# Patient Record
Sex: FEMALE | Race: BLACK OR AFRICAN AMERICAN | Employment: OTHER | URBAN - METROPOLITAN AREA
[De-identification: names, ages, dates, MRNs, and addresses within clinical notes are randomized per-mention and may not be internally consistent; named-entity substitution may affect disease eponyms.]

---

## 2019-11-20 RX ORDER — ESOMEPRAZOLE MAGNESIUM 40 MG/1
40 CAPSULE, DELAYED RELEASE ORAL DAILY
COMMUNITY

## 2019-11-20 NOTE — PERIOP NOTES
[facility]  Endoscopy Preprocedure Instructions      1. On the day of your surgery, please report to registration located on the 2nd floor of the  [facility]. yes    2. You must have a responsible adult to drive you to the hospital, stay at the hospital during your procedure and drive you home. If they leave your procedure will not be started (no exceptions). yes    3. Do not have anything to eat or drink (including water, gum, mints, coffee, and juice) after midnight. This does not apply to the medications you were instructed to take by your physician. yesIf you are currently taking Plavix, Coumadin, Aspirin, or other blood-thinning agents, contact your physician for special instructions. not applicable    4. If you are having a procedure that requires bowel prep: We recommend that you have only clear liquids the day before your procedure and begin your bowel prep by 5:00 pm.  You may continue to drink clear liquids until midnight. If for any reason you are not able to complete your prep please notify your physician immediately. yes    5. Have a list of all current medications, including vitamins, herbal supplements and any other over the counter medications. yes    6. If you wear glasses, contacts, dentures and/or hearing aids, they may be removed prior to procedure, please bring a case to store them in. yes    7. You should understand that if you do not follow these instructions your procedure may be cancelled. If your physical condition changes (I.e. fever, cold or flu) please contact your doctor as soon as possible. yes    8. It is important that you be on time.   If for any reason you are unable to keep your appointment please call [phone] the day of or your physicians office prior to your scheduled procedure

## 2019-11-26 ENCOUNTER — ANESTHESIA EVENT (OUTPATIENT)
Dept: ENDOSCOPY | Age: 69
End: 2019-11-26
Payer: MEDICARE

## 2019-11-26 ENCOUNTER — ANESTHESIA (OUTPATIENT)
Dept: ENDOSCOPY | Age: 69
End: 2019-11-26
Payer: MEDICARE

## 2019-11-26 ENCOUNTER — HOSPITAL ENCOUNTER (OUTPATIENT)
Age: 69
Setting detail: OUTPATIENT SURGERY
Discharge: HOME | End: 2019-11-26
Attending: SPECIALIST | Admitting: SPECIALIST
Payer: MEDICARE

## 2019-11-26 VITALS
TEMPERATURE: 97.6 F | WEIGHT: 136.24 LBS | HEART RATE: 58 BPM | DIASTOLIC BLOOD PRESSURE: 87 MMHG | HEIGHT: 62 IN | BODY MASS INDEX: 25.07 KG/M2 | OXYGEN SATURATION: 100 % | SYSTOLIC BLOOD PRESSURE: 139 MMHG | RESPIRATION RATE: 18 BRPM

## 2019-11-26 LAB — HGB BLD-MCNC: 9.2 G/DL (ref 11.5–16)

## 2019-11-26 PROCEDURE — 76060000031 HC ANESTHESIA FIRST 0.5 HR: Performed by: SPECIALIST

## 2019-11-26 PROCEDURE — 77030010936 HC CLP LIG BSC -C: Performed by: SPECIALIST

## 2019-11-26 PROCEDURE — 77030003657 HC NDL SCLER BSC -B: Performed by: SPECIALIST

## 2019-11-26 PROCEDURE — 74011250636 HC RX REV CODE- 250/636: Performed by: NURSE ANESTHETIST, CERTIFIED REGISTERED

## 2019-11-26 PROCEDURE — 74011000250 HC RX REV CODE- 250: Performed by: NURSE ANESTHETIST, CERTIFIED REGISTERED

## 2019-11-26 PROCEDURE — 85018 HEMOGLOBIN: CPT

## 2019-11-26 PROCEDURE — 74011250636 HC RX REV CODE- 250/636: Performed by: PHYSICIAN ASSISTANT

## 2019-11-26 PROCEDURE — 88305 TISSUE EXAM BY PATHOLOGIST: CPT

## 2019-11-26 PROCEDURE — 76040000019: Performed by: SPECIALIST

## 2019-11-26 PROCEDURE — 77030021593 HC FCPS BIOP ENDOSC BSC -A: Performed by: SPECIALIST

## 2019-11-26 RX ORDER — FLUMAZENIL 0.1 MG/ML
0.2 INJECTION INTRAVENOUS
Status: DISCONTINUED | OUTPATIENT
Start: 2019-11-26 | End: 2019-11-26 | Stop reason: HOSPADM

## 2019-11-26 RX ORDER — EPINEPHRINE 0.1 MG/ML
1 INJECTION INTRACARDIAC; INTRAVENOUS
Status: COMPLETED | OUTPATIENT
Start: 2019-11-26 | End: 2019-11-26

## 2019-11-26 RX ORDER — SODIUM CHLORIDE 9 MG/ML
50 INJECTION, SOLUTION INTRAVENOUS CONTINUOUS
Status: DISCONTINUED | OUTPATIENT
Start: 2019-11-26 | End: 2019-11-26 | Stop reason: HOSPADM

## 2019-11-26 RX ORDER — LIDOCAINE HYDROCHLORIDE 20 MG/ML
INJECTION, SOLUTION EPIDURAL; INFILTRATION; INTRACAUDAL; PERINEURAL AS NEEDED
Status: DISCONTINUED | OUTPATIENT
Start: 2019-11-26 | End: 2019-11-26 | Stop reason: HOSPADM

## 2019-11-26 RX ORDER — DEXTROMETHORPHAN/PSEUDOEPHED 2.5-7.5/.8
1.2 DROPS ORAL
Status: DISCONTINUED | OUTPATIENT
Start: 2019-11-26 | End: 2019-11-26 | Stop reason: HOSPADM

## 2019-11-26 RX ORDER — NALOXONE HYDROCHLORIDE 0.4 MG/ML
0.4 INJECTION, SOLUTION INTRAMUSCULAR; INTRAVENOUS; SUBCUTANEOUS
Status: DISCONTINUED | OUTPATIENT
Start: 2019-11-26 | End: 2019-11-26 | Stop reason: HOSPADM

## 2019-11-26 RX ORDER — MIDAZOLAM HYDROCHLORIDE 1 MG/ML
.25-5 INJECTION, SOLUTION INTRAMUSCULAR; INTRAVENOUS AS NEEDED
Status: DISCONTINUED | OUTPATIENT
Start: 2019-11-26 | End: 2019-11-26 | Stop reason: HOSPADM

## 2019-11-26 RX ORDER — FENTANYL CITRATE 50 UG/ML
25 INJECTION, SOLUTION INTRAMUSCULAR; INTRAVENOUS AS NEEDED
Status: DISCONTINUED | OUTPATIENT
Start: 2019-11-26 | End: 2019-11-26 | Stop reason: HOSPADM

## 2019-11-26 RX ORDER — PROPOFOL 10 MG/ML
INJECTION, EMULSION INTRAVENOUS AS NEEDED
Status: DISCONTINUED | OUTPATIENT
Start: 2019-11-26 | End: 2019-11-26 | Stop reason: HOSPADM

## 2019-11-26 RX ORDER — ATROPINE SULFATE 0.1 MG/ML
0.5 INJECTION INTRAVENOUS
Status: DISCONTINUED | OUTPATIENT
Start: 2019-11-26 | End: 2019-11-26 | Stop reason: HOSPADM

## 2019-11-26 RX ORDER — PROPOFOL 10 MG/ML
INJECTION, EMULSION INTRAVENOUS
Status: DISCONTINUED | OUTPATIENT
Start: 2019-11-26 | End: 2019-11-26 | Stop reason: HOSPADM

## 2019-11-26 RX ORDER — SODIUM CHLORIDE 9 MG/ML
INJECTION, SOLUTION INTRAVENOUS
Status: DISCONTINUED | OUTPATIENT
Start: 2019-11-26 | End: 2019-11-26 | Stop reason: HOSPADM

## 2019-11-26 RX ADMIN — LIDOCAINE HYDROCHLORIDE 20 MG: 20 INJECTION, SOLUTION INTRAVENOUS at 10:50

## 2019-11-26 RX ADMIN — EPINEPHRINE 0.1 MG: 0.1 INJECTION, SOLUTION ENDOTRACHEAL; INTRACARDIAC; INTRAVENOUS at 11:12

## 2019-11-26 RX ADMIN — SODIUM CHLORIDE: 9 INJECTION, SOLUTION INTRAVENOUS at 11:16

## 2019-11-26 RX ADMIN — PROPOFOL 60 MG: 10 INJECTION, EMULSION INTRAVENOUS at 10:46

## 2019-11-26 RX ADMIN — PROPOFOL 20 MG: 10 INJECTION, EMULSION INTRAVENOUS at 10:59

## 2019-11-26 RX ADMIN — SODIUM CHLORIDE: 9 INJECTION, SOLUTION INTRAVENOUS at 10:41

## 2019-11-26 RX ADMIN — PROPOFOL 100 MCG/KG/MIN: 10 INJECTION, EMULSION INTRAVENOUS at 10:46

## 2019-11-26 RX ADMIN — LIDOCAINE HYDROCHLORIDE 60 MG: 20 INJECTION, SOLUTION INTRAVENOUS at 10:46

## 2019-11-26 RX ADMIN — PROPOFOL 40 MG: 10 INJECTION, EMULSION INTRAVENOUS at 10:50

## 2019-11-26 RX ADMIN — PROPOFOL 30 MG: 10 INJECTION, EMULSION INTRAVENOUS at 10:57

## 2019-11-26 NOTE — ANESTHESIA POSTPROCEDURE EVALUATION
Procedure(s):  COLONOSCOPY  ESOPHAGOGASTRODUODENOSCOPY (EGD)  ESOPHAGOGASTRODUODENAL (EGD) BIOPSY  RESOLUTION CLIP  INJECTION. MAC    Anesthesia Post Evaluation      Multimodal analgesia: multimodal analgesia not used between 6 hours prior to anesthesia start to PACU discharge  Patient location during evaluation: PACU  Patient participation: complete - patient participated  Level of consciousness: awake and alert  Pain score: 0  Pain management: adequate  Airway patency: patent  Anesthetic complications: no  Cardiovascular status: hemodynamically stable and acceptable  Respiratory status: acceptable  Hydration status: acceptable  Comments: Patient seen and evaluated; no concerns. Post anesthesia nausea and vomiting:  none      Vitals Value Taken Time   /73 11/26/2019 11:48 AM   Temp 36.4 °C (97.6 °F) 11/26/2019 11:23 AM   Pulse 60 11/26/2019 11:52 AM   Resp 15 11/26/2019 11:52 AM   SpO2 100 % 11/26/2019 11:52 AM   Vitals shown include unvalidated device data.

## 2019-11-26 NOTE — PROCEDURES
[address] [facility] CONRAD Null MD  [phone]      2019    Esophagogastroduodenoscopy & Colonoscopy Procedure Note  Betty Escobar  : 1950  [address] [place] [address] Medical Record Number: [id number]      Indications:    Iron deficiency anemia, Melena/hematochezia Occult blood in stool  and Screening Colonoscopy   Referring Physician:  Lucius Garza MD  Anesthesia/Sedation: Conscious Sedation/Moderate Sedation/MAC  Endoscopist:  Dr. Maren Xavier  Complications:  None  Estimated Blood Loss:  None    Permit:  The indications, risks, benefits and alternatives were reviewed with the patient or their decision maker who was provided an opportunity to ask questions and all questions were answered. The specific risks of esophagogastroduodenoscopy with conscious sedation were reviewed, including but not limited to anesthetic complication, bleeding, adverse drug reaction, missed lesion, infection, IV site reactions, and intestinal perforation which would lead to the need for surgical repair. Alternatives to EGD and colonoscopy including radiographic imaging, observation without testing, or laboratory testing were reviewed as well as the limitations of those alternatives discussed. After considering the options and having all their questions answered, the patient or their decision maker provided both verbal and written consent to proceed. -----------EGD------------   Procedure in Detail:  After obtaining informed consent, positioning of the patient in the left lateral decubitus position, and conduction of a pre-procedure pause or \"time out\" the endoscope was introduced into the mouth and advanced to the duodenum. A careful inspection was made, and findings or interventions are described below. Findings:   Esophagus:normal  Stomach: Mild gastric erythema without ulceration or bleeding.   Cold forceps biopsies obtained for histology. Duodenum/jejunum: normal.  Cold forceps biopsies taken for histology. No blood in the upper GI tract today.        ----------Colonoscopy-----------    Procedure in Detail:  After obtaining informed consent, positioning of the patient in the left lateral decubitus position, and conduction of a pre-procedure pause or \"time out\" the endoscope was introduced into the anus and advanced to the cecum, which was identified by the ileocecal valve and appendiceal orifice. The quality of the colonic preparation was good. A careful inspection was made as the colonoscope was withdrawn, findings and interventions are described below. Findings:   Arduous colonoscopy today due to a large redundant sigmoid. Actively bleeding AVM of the cecum x1 - required clips x3 and 1cc 1:19991 epi for cessation of bleeding. Another AVM of cecum not bleeding - clipped for ablation. In the rectum, small internal hemorrhoids are noted without bleeding. No polyps ulcers colon masses or other AVMs noted. ------------------------------  Specimens:    none    Complications:   None; patient tolerated the procedure well. Impressions:  EGD:  Mild gastritis  Colonoscopy: AVM cecum bleeding - ablated. Recommendations:     - Await pathology.  -Continue iron and follow h/h to see if the treatment of the cecum AVMs resolve her anemia. Thank you for entrusting me with this patient's care. Please do not hesitate to contact me with any questions or if I can be of assistance with any of your other patients' GI needs. Signed By: Jessica Calixto MD                        November 26, 2019    Surgical assistant none. Implants none unless specified.

## 2019-11-26 NOTE — PERIOP NOTES
1046: Anesthesia staff at patient's bedside administering anesthesia and monitoring patients vital signs throughout procedure. See anesthesia note. 1115: Patient tolerated procedure without problems. Abdomen soft and patient arousable and voices no complaints Report received from CRNA, see anesthesia note. Patient transported to endoscopy recovery area.  Endoscope was pre-cleaned at bedside immediately following procedure by Aiden Desai.     1120: Report given to recovery nurse Jasmyne Barrera RN

## 2019-11-26 NOTE — DISCHARGE INSTRUCTIONS
[address] [facility] CONRAD Garcia MD  [phone]      November 26, 2019    Leslie Lozada  YOB: 1950    COLONOSCOPY DISCHARGE INSTRUCTIONS    If there is redness at IV site you should apply warm compress to area. If redness or soreness persist contact Dr. Mei Garcia' or your primary care doctor. There may be a slight amount of blood passed from the rectum. Gaseous discomfort may develop, but walking, belching will help relieve this. You may not operate a vehicle for 12 hours  You may not operate machinery or dangerous appliances for rest of today  You may not drink alcoholic beverages for 12 hours  Avoid making any critical decisions for 24 hours    DIET:  You may resume your normal diet, but some patients find that heavy or large meals may lead to indigestion or vomiting. I suggest a light meal as first food intake. MEDICATIONS:  The use of some over-the-counter pain medication may lead to bleeding after colon biopsies or polyp removal.  Tylenol (also called acetaminophen) is safe to take even if you have had colonoscopy with polyp removal.  Based on the procedure you had today you may not safely take aspirin or aspirin-like products for the next ten (10) days. Remember that Tylenol (also called acetaminophen) is safe to take after colonoscopy even if you have had biopsies or polyps removed. ACTIVITY:  You may resume your normal household activities, but it is recommended that you spend the remainder of the day resting -  avoid any strenuous activity. CALL DR. Marin Ceja' OFFICE IF:  Increasing pain, nausea, vomiting  Abdominal distension (swelling)  Significant new or increased bleeding (oral or rectal)  Fever/Chills  Chest pain/shortness of breath                       Additional instructions:   No aspirin 10 days. We found two blood vessels in the colon which were bleeding and we stapled them closed today. I think this is the cause of your anemia. I want you to keep taking iron and get your blood tested again in 8 weeks. It was an honor to be your doctor today. Please let me or my office staff know if you have any feedback about today's procedure. Sneha Melgoza MD    Colonoscopy saves lives, and can prevent colon cancer. Everyone aged 48 or older needs colonoscopy.   Tell your family and friends: get the test!

## 2019-11-26 NOTE — INTERVAL H&P NOTE
Pre-Endoscopy H&P Update Chief complaint/HPI/ROS:  The indication for the procedure, the patient's history and the patient's current medications are reviewed prior to the procedure and that data is reported on the H&P to which this document is attached. Any significant complaints with regard to organ systems will be noted, and if not mentioned then a review of systems is not contributory. Past Medical History:  
Diagnosis Date  Arthritis  Degenerative disc disease  Gastrointestinal disorder   
 acid reflux  GERD (gastroesophageal reflux disease)  Pneumonia 2011 Past Surgical History:  
Procedure Laterality Date  HX CHOLECYSTECTOMY    HX HYSTERECTOMY  HX OTHER SURGICAL Previous GI bleed Social  
Social History Tobacco Use  Smoking status: Former Smoker Last attempt to quit: 1987 Years since quittin.4  Smokeless tobacco: Never Used Substance Use Topics  Alcohol use: Yes Alcohol/week: 0.8 standard drinks Types: 1 Glasses of wine per week Comment: rare Family History Problem Relation Age of Onset  Cancer Father  Diabetes Mother  Cancer Brother Allergies Allergen Reactions  Levaquin [Levofloxacin] Hives Prior to Admission Medications Prescriptions Last Dose Informant Patient Reported? Taking? BISACODYL (DULCOLAX PO) 2019 at Unknown time  Yes Yes Sig: Take 1 Tab by mouth two (2) times a day. CALCIUM CARBONATE/VITAMIN D3 (CALCIUM 600 + D,3, PO) 2019  Yes No  
Sig: Take 1 Tab by mouth two (2) times a day. MULTIVITAMIN PO 2019  Yes No  
Sig: Take 1 Tab by mouth.  
esomeprazole (NEXIUM) 40 mg capsule 2019 at Unknown time  Yes Yes Sig: Take 40 mg by mouth daily. guaifenesin SR (MUCINEX) 600 mg SR tablet 2019 at Unknown time  No Yes Sig: Take 1 Tab by mouth two (2) times daily as needed for Congestion. lactulose (CHRONULAC) 10 gram/15 mL solution Not Taking at Unknown time  No No  
Sig: Take 15 mL by mouth every eight (8) hours as needed for Other (constipation). loratadine (CLARITIN) 10 mg tablet 4/26/2019 at Unknown time  Yes No  
Sig: Take 10 mg by mouth daily. omeprazole (PRILOSEC) 20 mg capsule Not Taking at Unknown time  Yes No  
Sig: Take 20 mg by mouth two (2) times a day. polyethylene glycol 3350 (MIRALAX PO) Not Taking at Unknown time  Yes No  
Sig: Take  by mouth.  
potassium chloride (K-DUR, KLOR-CON) 10 mEq tablet Not Taking at Unknown time  No No  
Sig: Take 1 Tab by mouth two (2) times a day. Facility-Administered Medications: None PHYSICAL EXAM:  The patient is examined immediately prior to the procedure. Visit Vitals BP 98/50 Pulse 68 Resp 16 Ht 5' 2\" (1.575 m) Wt 61.8 kg (136 lb 3.9 oz) SpO2 100% BMI 24.92 kg/m² Gen: Appears comfortable, no distress. Pulm: comfortable respirations with no abnormal audible breath sounds HEART: well perfused, no abnormal audible heart sounds GI: abdomen flat. PLAN:  Informed consent discussion held, patient afforded an opportunity to ask questions and all questions answered. After being advised of the risks, benefits, and alternatives, the patient requested that we proceed and indicated so on a written consent form. Will proceed with procedure as planned.  
Jesus Alberto Oh MD

## 2019-11-26 NOTE — ROUTINE PROCESS
Adalid Dashawn 1950 
[id number] Situation: 
Verbal report received from: LG Desai RN Procedure: Procedure(s) with comments: 
COLONOSCOPY 
ESOPHAGOGASTRODUODENOSCOPY (EGD) ESOPHAGOGASTRODUODENAL (EGD) BIOPSY RESOLUTION CLIP - x3 Background: 
 
Preoperative diagnosis: SCREENING COLONOSCOPY(SCREENING FOR COLONIC NEOPLASIA) HEMATOCHEZIA 
GERD ANEMIA,UNSPECIFIED Postoperative diagnosis: AVM cecum, hemorrhoids :  Dr. Jones Mena Assistant(s): Endoscopy Technician-1: Rosa M Sidhu Endoscopy RN-1: Shamir Rhodes RN Specimens:  
ID Type Source Tests Collected by Time Destination 1 : Biopsy  Preservative Duodenum  Crow Cloes MD 11/26/2019 1050 Pathology 2 : Biopsy Antrum Preservative   Crow Coles MD 11/26/2019 1050 Pathology H. Pylori  no Assessment: 
Intra-procedure medications Anesthesia gave intra-procedure sedation and medications, see anesthesia flow sheet yes Intravenous fluids: NS@ Arline Banco Vital signs stable Abdominal assessment: round and soft Recommendation: 
Discharge patient per MD order. Family or Friend Permission to share finding with family or friend yes Endoscopy discharge instructions have been reviewed and given to patient and cousin. The patient and cousin verbalized understanding and acceptance of instructions.

## 2019-11-26 NOTE — ANESTHESIA PREPROCEDURE EVALUATION
Relevant Problems   No relevant active problems       Anesthetic History   No history of anesthetic complications            Review of Systems / Medical History  Patient summary reviewed and nursing notes reviewed    Pulmonary  Within defined limits                 Neuro/Psych   Within defined limits           Cardiovascular                  Exercise tolerance: >4 METS     GI/Hepatic/Renal     GERD           Endo/Other        Arthritis and anemia     Other Findings   Comments: Patient admits to having GI bleed           Physical Exam    Airway  Mallampati: II    Neck ROM: normal range of motion   Mouth opening: Normal     Cardiovascular    Rhythm: regular  Rate: normal         Dental         Pulmonary  Breath sounds clear to auscultation               Abdominal         Other Findings            Anesthetic Plan    ASA: 2  Anesthesia type: MAC            Anesthetic plan and risks discussed with: Patient      Informed consent obtained.

## 2019-11-26 NOTE — H&P
Date: 2019 10:45 AM   Patient Name: Diana Ruiz   Account #: [id number]    Gender: Female    (age): 1950 (77)       Provider:     Daphine Apgar. Janet Chinchilla MD        Referring Physician:     Aly Solano MD  [address] [facility], [address] [place] [address] Ave  [phone] (phone)  [phone] (fax)        Chief Complaint: I want to get checked           History of Present Illness:   77year old female reports intermittent anal bleeding with hard stool, she will see streaks of blood on toilet tissue. She discussed this with Dr. Chet Harada at her recent annual physical and was found to have occult positive stool for blood as well as mild anemia. She's started iron therapy and notes intermittent black stool since then. Has GERD with intermittent pyrosis and heartburn for which she takes PPI with good control. Denies abdominal pain, change in bowel habits other than what is mentioned here, denies thin stool or rectal anal pain. We negotiated that she would have bidirectional endoscopy and possibly capsule endoscopy for these symptoms? 77year old female reports intermittent anal bleeding with hard stool, she will see streaks of blood on toilet tissue. She discussed this with Dr. Chet Harada at her recent annual physical and was found to have occult positive stool for blood as well as mild anemia. She's started iron therapy and notes intermittent black stool since then. Has GERD with intermittent pyrosis and heartburn for which she takes PPI with good control. Denies abdominal pain, change in bowel habits other than what is mentioned here, denies thin stool or rectal anal pain. We negotiated that she would have bidirectional endoscopy and possibly capsule endoscopy for these symptoms?        Past Medical History      Medical Conditions: acid reflux  Anemia  Arthritis  constipation   Surgical Procedures: Gallbladder surgery, 2011  Hysterectomy, 1992   Dx Studies: Abdominal U/S,   Barium Swallow, 2011  CAT Scan, 2011  Colonoscopy, 2010/2011  Egd With Bravo Placement, 8/5/2013, No Esophagitis Was Present, No Hiatal Hernia Was Noted and No Cause For Symptoms of excess salivation or reflux Was Found. Pre-Procedure Call, 7/2/2013   Medications: Claritin 10 mg Take 1 tablet by mouth once a day  ferrous sulfate 325 mg (65 mg iron) Take 1 tablet by mouth once a day  Nexium 40 mg Take 1 capsule by mouth once a day before breakfast for 90 days  Stool Softener 100 mg Take 2 capsule by mouth once a day   Allergies: hydroxyzine HCl  Levaquin - rash   Immunizations: Influenza, seasonal, injectable, 9/21/2019  Pneumococcal conjugate PCV 13, 2017      Social History      Alcohol: None   Tobacco: Former smokerCigarettes 10 cigarettes a day, quit 1987. Drugs: None   Exercise: Exercise 3 or more times a week. Walking/ Running. Caffeine: None   Marital Status:          Occupation:               Family History No history of Celiac sprue, Colon Cancer, Colon Polyps, Esophageal Cancer, Esophogeal Cancer, IBD (Crohn's or UC), Inflammatory bowel disease (Crohn's or Ulcerative Colitis), Liver disease, Stomach Cancer  Other: Diagnosed with Stomach cancer; Father: Diagnosed with GI cancers; Review of Systems:   Cardiovascular: Denies chest pain, irregular heart beat, palpitations, peripheral edema, syncope, Sweats. Constitutional: Denies fatigue, fever, loss of appetite, weight gain, weight loss. ENMT: Denies nose bleeds, sore throat, hearing loss. Endocrine: Denies excessive thirst, heat intolerance. Eyes: Denies loss of vision. Gastrointestinal: Denies abdominal pain, abdominal swelling, change in bowel habits, constipation, diarrhea, Bloating/gas, heartburn, jaundice, nausea, rectal bleeding, stomach cramps, vomiting, dysphagia, rectal pain, Stool incontinence, hematemesis. Genitourinary: Denies dark urine, dysuria, frequent urination, hematuria, incontinence.    Hematologic/Lymphatic: Denies easy bruising, prolonged bleeding. Integumentary: Denies itching, rashes, sun sensitivity. Musculoskeletal: Presents suffers from arthritis, back pain. Denies gout, joint pain, muscle weakness, stiffness. Neurological: Denies dizziness, fainting, frequent headaches, memory loss. Psychiatric: Denies anxiety, depression, difficulty sleeping, hallucinations, nervousness, panic attacks, paranoia. Respiratory: Denies cough, dyspnea, wheezing. Vital Signs:   BP  (mmHg)  Pulse  (ppm) Weight (lbs/oz) Height (ft/in) BMI Temp   164/81 64 140 / 2 5 / 2 25.63 97.5 (F)      Physical Exam:   Constitutional:      Appearance: No distress, appears comfortable. Communication: Understands/receives spoken information. Skin:      Inspection: No rash, no jaundice. Palpation: No subcutaneous nodules. Head/face: Inspection: Normacephalic, atraumatic. Palpation: normal.   Eyes:      Conjunctivae/lids: Normal.   Pupils/irises: Pupils equal, round and normal.   ENMT:      External: Normal.   Hearing: Normal.   Neck:      Neck: Normal appearance, trachea midline. Jugular veins: No JVD noted. Respiratory:      Effort: Normal respiratory effort, comfortable, speaks in complete sentences. Auscultation: normal breath sounds, no rubs, wheezes or rhonchi. Gastrointestinal/Abdomen:      Abdomen: non-distended, nontender. Liver/Spleen: normal, normal size, Liver size and consistency normal, spleen is non-palpable. Musculoskeletal:      Gait/station: normal.   Digits/nails: Normal, no spooning of nails, clubbing, or splinter hemorrhages, no clubbing, cyanosis, petechiae or other inflammatory conditions. Psychiatric:      Judgment/insight: Normal, normal judgement, normal insight. Orientation: oriented to time, space and person. Lymphatic:      Neck: No lymphadenopathy in the cervical or supraclavicular chain. Other: No periumbilic lymphadenopathy.         Lab Results: No Electronic Results      Impressions: Screening colonoscopy (Screening for colonic neoplasia)  GERD  Hematochezia  Anemia, unspecified? ?Screening colonoscopy (Screening for colonic neoplasia)? ?  ? ?GERD? ?  ? ? Hematochezia? ?  ? ? Anemia, unspecified? Assessment: ?         Plan: Upper Endoscopy  Colonoscopy? ? Upper Endoscopy? ?  ? ? Colonoscopy? Risk & Medical Necessity: The patient requires Moderate to High Severity care for this visit. Diagnosis and management options are Minimal. The amount of data reviewed and/or ordered is Minimal/None. The level of risk is Minimal.           Notes:              Sarah Chowdhury MD     Electronically signed on 2019 10:58:45 AM by Sarah Chowdhury, [address] [facility], MRN [id number],  1950 First Visit, 2019                                                                                                                                                        New     Modify          Delete     Delete all     Edit Wording          Sign     [email or web address]

## 2019-12-22 ENCOUNTER — APPOINTMENT (OUTPATIENT)
Dept: GENERAL RADIOLOGY | Age: 69
End: 2019-12-22
Attending: EMERGENCY MEDICINE
Payer: MEDICARE

## 2019-12-22 ENCOUNTER — HOSPITAL ENCOUNTER (OUTPATIENT)
Age: 69
Setting detail: OBSERVATION
Discharge: HOME | End: 2019-12-22
Attending: EMERGENCY MEDICINE | Admitting: INTERNAL MEDICINE
Payer: MEDICARE

## 2019-12-22 VITALS
DIASTOLIC BLOOD PRESSURE: 69 MMHG | SYSTOLIC BLOOD PRESSURE: 128 MMHG | HEART RATE: 68 BPM | OXYGEN SATURATION: 98 % | TEMPERATURE: 97.8 F | RESPIRATION RATE: 13 BRPM

## 2019-12-22 DIAGNOSIS — M25.512 PAIN IN JOINT OF LEFT SHOULDER: ICD-10-CM

## 2019-12-22 DIAGNOSIS — R07.89 CHEST WALL PAIN: Primary | ICD-10-CM

## 2019-12-22 LAB
ALBUMIN SERPL-MCNC: 3.4 G/DL (ref 3.5–5)
ALBUMIN/GLOB SERPL: 0.8 {RATIO} (ref 1.1–2.2)
ALP SERPL-CCNC: 64 U/L (ref 45–117)
ALT SERPL-CCNC: 19 U/L (ref 12–78)
ANION GAP SERPL CALC-SCNC: 6 MMOL/L (ref 5–15)
AST SERPL-CCNC: 12 U/L (ref 15–37)
BASOPHILS # BLD: 0.1 K/UL (ref 0–0.1)
BASOPHILS NFR BLD: 1 % (ref 0–1)
BILIRUB SERPL-MCNC: 0.2 MG/DL (ref 0.2–1)
BUN SERPL-MCNC: 8 MG/DL (ref 6–20)
BUN/CREAT SERPL: 10 (ref 12–20)
CALCIUM SERPL-MCNC: 8.9 MG/DL (ref 8.5–10.1)
CHLORIDE SERPL-SCNC: 107 MMOL/L (ref 97–108)
CO2 SERPL-SCNC: 27 MMOL/L (ref 21–32)
COMMENT, HOLDF: NORMAL
CREAT SERPL-MCNC: 0.79 MG/DL (ref 0.55–1.02)
DIFFERENTIAL METHOD BLD: ABNORMAL
EOSINOPHIL # BLD: 0.2 K/UL (ref 0–0.4)
EOSINOPHIL NFR BLD: 4 % (ref 0–7)
ERYTHROCYTE [DISTWIDTH] IN BLOOD BY AUTOMATED COUNT: 13.1 % (ref 11.5–14.5)
GLOBULIN SER CALC-MCNC: 4.2 G/DL (ref 2–4)
GLUCOSE SERPL-MCNC: 104 MG/DL (ref 65–100)
HCT VFR BLD AUTO: 32.8 % (ref 35–47)
HGB BLD-MCNC: 10.2 G/DL (ref 11.5–16)
IMM GRANULOCYTES # BLD AUTO: 0 K/UL (ref 0–0.04)
IMM GRANULOCYTES NFR BLD AUTO: 1 % (ref 0–0.5)
LYMPHOCYTES # BLD: 1.3 K/UL (ref 0.8–3.5)
LYMPHOCYTES NFR BLD: 26 % (ref 12–49)
MCH RBC QN AUTO: 27.1 PG (ref 26–34)
MCHC RBC AUTO-ENTMCNC: 31.1 G/DL (ref 30–36.5)
MCV RBC AUTO: 87.2 FL (ref 80–99)
MONOCYTES # BLD: 0.4 K/UL (ref 0–1)
MONOCYTES NFR BLD: 8 % (ref 5–13)
NEUTS SEG # BLD: 3.1 K/UL (ref 1.8–8)
NEUTS SEG NFR BLD: 60 % (ref 32–75)
NRBC # BLD: 0 K/UL (ref 0–0.01)
NRBC BLD-RTO: 0 PER 100 WBC
PLATELET # BLD AUTO: 412 K/UL (ref 150–400)
PMV BLD AUTO: 10.2 FL (ref 8.9–12.9)
POTASSIUM SERPL-SCNC: 3.7 MMOL/L (ref 3.5–5.1)
PROT SERPL-MCNC: 7.6 G/DL (ref 6.4–8.2)
RBC # BLD AUTO: 3.76 M/UL (ref 3.8–5.2)
SAMPLES BEING HELD,HOLD: NORMAL
SODIUM SERPL-SCNC: 140 MMOL/L (ref 136–145)
TROPONIN I SERPL-MCNC: <0.05 NG/ML
TROPONIN I SERPL-MCNC: <0.05 NG/ML
WBC # BLD AUTO: 5 K/UL (ref 3.6–11)

## 2019-12-22 PROCEDURE — 80053 COMPREHEN METABOLIC PANEL: CPT

## 2019-12-22 PROCEDURE — 74011250637 HC RX REV CODE- 250/637: Performed by: EMERGENCY MEDICINE

## 2019-12-22 PROCEDURE — 96372 THER/PROPH/DIAG INJ SC/IM: CPT

## 2019-12-22 PROCEDURE — 36415 COLL VENOUS BLD VENIPUNCTURE: CPT

## 2019-12-22 PROCEDURE — 74011250636 HC RX REV CODE- 250/636: Performed by: INTERNAL MEDICINE

## 2019-12-22 PROCEDURE — 99218 HC RM OBSERVATION: CPT

## 2019-12-22 PROCEDURE — 93005 ELECTROCARDIOGRAM TRACING: CPT

## 2019-12-22 PROCEDURE — 74011250637 HC RX REV CODE- 250/637: Performed by: INTERNAL MEDICINE

## 2019-12-22 PROCEDURE — 99285 EMERGENCY DEPT VISIT HI MDM: CPT

## 2019-12-22 PROCEDURE — 84484 ASSAY OF TROPONIN QUANT: CPT

## 2019-12-22 PROCEDURE — 71046 X-RAY EXAM CHEST 2 VIEWS: CPT

## 2019-12-22 PROCEDURE — 85025 COMPLETE CBC W/AUTO DIFF WBC: CPT

## 2019-12-22 RX ORDER — SODIUM CHLORIDE 0.9 % (FLUSH) 0.9 %
5-40 SYRINGE (ML) INJECTION AS NEEDED
Status: DISCONTINUED | OUTPATIENT
Start: 2019-12-22 | End: 2019-12-22 | Stop reason: HOSPADM

## 2019-12-22 RX ORDER — SODIUM CHLORIDE 0.9 % (FLUSH) 0.9 %
5-40 SYRINGE (ML) INJECTION EVERY 8 HOURS
Status: DISCONTINUED | OUTPATIENT
Start: 2019-12-22 | End: 2019-12-22 | Stop reason: HOSPADM

## 2019-12-22 RX ORDER — ONDANSETRON 2 MG/ML
4 INJECTION INTRAMUSCULAR; INTRAVENOUS
Status: DISCONTINUED | OUTPATIENT
Start: 2019-12-22 | End: 2019-12-22 | Stop reason: HOSPADM

## 2019-12-22 RX ORDER — ACETAMINOPHEN 325 MG/1
650 TABLET ORAL
Status: DISCONTINUED | OUTPATIENT
Start: 2019-12-22 | End: 2019-12-22 | Stop reason: HOSPADM

## 2019-12-22 RX ORDER — PANTOPRAZOLE SODIUM 40 MG/1
40 TABLET, DELAYED RELEASE ORAL
Status: DISCONTINUED | OUTPATIENT
Start: 2019-12-22 | End: 2019-12-22 | Stop reason: HOSPADM

## 2019-12-22 RX ORDER — NALOXONE HYDROCHLORIDE 0.4 MG/ML
0.4 INJECTION, SOLUTION INTRAMUSCULAR; INTRAVENOUS; SUBCUTANEOUS AS NEEDED
Status: DISCONTINUED | OUTPATIENT
Start: 2019-12-22 | End: 2019-12-22 | Stop reason: HOSPADM

## 2019-12-22 RX ORDER — GUAIFENESIN 100 MG/5ML
81 LIQUID (ML) ORAL DAILY
Status: DISCONTINUED | OUTPATIENT
Start: 2019-12-23 | End: 2019-12-22 | Stop reason: HOSPADM

## 2019-12-22 RX ORDER — ASPIRIN 325 MG
325 TABLET ORAL
Status: COMPLETED | OUTPATIENT
Start: 2019-12-22 | End: 2019-12-22

## 2019-12-22 RX ORDER — MORPHINE SULFATE 2 MG/ML
2 INJECTION, SOLUTION INTRAMUSCULAR; INTRAVENOUS
Status: DISCONTINUED | OUTPATIENT
Start: 2019-12-22 | End: 2019-12-22 | Stop reason: HOSPADM

## 2019-12-22 RX ORDER — ENOXAPARIN SODIUM 100 MG/ML
40 INJECTION SUBCUTANEOUS EVERY 24 HOURS
Status: DISCONTINUED | OUTPATIENT
Start: 2019-12-22 | End: 2019-12-22 | Stop reason: HOSPADM

## 2019-12-22 RX ADMIN — ENOXAPARIN SODIUM 40 MG: 40 INJECTION SUBCUTANEOUS at 08:23

## 2019-12-22 RX ADMIN — Medication 10 ML: at 07:11

## 2019-12-22 RX ADMIN — ASPIRIN 325 MG: 325 TABLET, FILM COATED ORAL at 06:29

## 2019-12-22 RX ADMIN — PANTOPRAZOLE SODIUM 40 MG: 40 TABLET, DELAYED RELEASE ORAL at 08:24

## 2019-12-22 NOTE — ED NOTES
Bedside shift change report given to Theo Ellison  (oncoming nurse) by iCarra Mercer RN (offgoing nurse).  Report included the following information SBAR, Kardex, Intake/Output, MAR, Recent Results

## 2019-12-22 NOTE — DISCHARGE INSTRUCTIONS
ACUTE DIAGNOSES:  Atypical chest pain [R07.89]    CHRONIC MEDICAL DIAGNOSES:  Problem List as of 12/22/2019 Date Reviewed: 9/16/2011          Codes Class Noted - Resolved    * (Principal) Atypical chest pain ICD-10-CM: R07.89  ICD-9-CM: 786.59  12/22/2019 - Present        Depression with anxiety ICD-10-CM: F41.8  ICD-9-CM: 300.4  9/11/2011 - Present        Iron deficiency anemia, unspecified ICD-10-CM: D50.9  ICD-9-CM: 280.9  6/17/2011 - Present        GERD (gastroesophageal reflux disease) (Chronic) ICD-10-CM: K21.9  ICD-9-CM: 530.81  6/16/2011 - Present        RESOLVED: Hypopotassemia ICD-10-CM: E87.6  ICD-9-CM: 276.8  9/11/2011 - 9/16/2011        RESOLVED: Unspecified constipation ICD-10-CM: K59.00  ICD-9-CM: 564.00  9/11/2011 - 9/16/2011        RESOLVED: GI bleed ICD-10-CM: K92.2  ICD-9-CM: 578.9  9/11/2011 - 9/16/2011        RESOLVED: Abdominal pain, generalized ICD-10-CM: R10.84  ICD-9-CM: 789.07  6/16/2011 - 9/16/2011        RESOLVED: Nausea & vomiting ICD-10-CM: R11.2  ICD-9-CM: 787.01  6/16/2011 - 9/16/2011        RESOLVED: Hyposmolality and/or hyponatremia ICD-10-CM: E87.1  ICD-9-CM: 276.1  6/16/2011 - 9/16/2011              DISCHARGE MEDICATIONS:          · It is important that you take the medication exactly as they are prescribed. · Keep your medication in the bottles provided by the pharmacist and keep a list of the medication names, dosages, and times to be taken in your wallet. · Do not take other medications without consulting your doctor. DIET:  Regular Diet    ACTIVITY: Activity as tolerated    ADDITIONAL INFORMATION: If you experience any of the following symptoms then please call your primary care physician or return to the emergency room if you cannot get hold of your doctor: Fever, chills, nausea, vomiting, diarrhea, change in mentation, falling, bleeding, shortness of breath.     FOLLOW UP CARE:  Dr. Lucius Garza MD  you are to call and set up an appointment to see them in 2 weeks.    Follow-up with cardiology ( they will make appointment for you). Information obtained by :  I understand that if any problems occur once I am at home I am to contact my physician. I understand and acknowledge receipt of the instructions indicated above.                                                                                                                                            Physician's or R.N.'s Signature                                                                  Date/Time                                                                                                                                              Patient or Representative Signature                                                          Date/Time

## 2019-12-22 NOTE — ED TRIAGE NOTES
Patient arrives from home via EMS for complaints of left shoulder pain that radiates down her left arm

## 2019-12-22 NOTE — CONSULTS
Becky Long DO  [facility] [id number] [facility], [address] [place] [address], [address] [place] [address] [address]                                       Office [phone],TYESHA [phone]  Office [phone],XWQ [phone]      Date of  Admission: 12/22/2019  3:25 AM  PCP- Renee Sotelo MD    Danyelle Morejon is a 68 y.o. female admitted for Atypical chest pain [R07.89]. Consult requested by Sharron Umaña MD    Assessment/Plan      Atypical chest pain syndrome    Initial troponin is negative. Recommend second troponin, if negative recommend d/c with outpatient stress testing. Will arrange for outpatient stress testing and f/up         I appreciate the opportunity to be involved in Ms. Mahmood. See below note for details. Please do not hesitate to contact us with questions or concerns. Arpita Gardner DO      Subjective:  Danyelle Morejon is a 68 y.o. female with hx of GERD presents with shoulder pain. She woke up last evening with left shoulder pain that radiated across her neck and to her right shoulder. At that same time she reports some discomfort in her chest.  She was concerned and came to ED to assure that she was not having a heart attack. No associated sob, diaphoresis, nausea. She has never developed any exertional angina symptoms. Reports having negative stress test many years ago. No significant CAD risk factors outside of age.     Past Medical History:   Diagnosis Date    Arthritis     Degenerative disc disease     Gastrointestinal disorder     acid reflux    GERD (gastroesophageal reflux disease)     Pneumonia 05/2011      Past Surgical History:   Procedure Laterality Date    COLONOSCOPY N/A 11/26/2019    COLONOSCOPY performed by Ousmane Vicente MD at [address] [facility] HX CHOLECYSTECTOMY  2011    HX HYSTERECTOMY      HX OTHER SURGICAL      Previous GI bleed     Allergies   Allergen Reactions    Levaquin [Levofloxacin] Hives     Family History   Problem Relation Age of Onset    Cancer Father     Diabetes Mother     Cancer Brother       Social History     Tobacco Use    Smoking status: Former Smoker     Last attempt to quit: 1987     Years since quittin.5    Smokeless tobacco: Never Used   Substance Use Topics    Alcohol use: Yes     Alcohol/week: 0.8 standard drinks     Types: 1 Glasses of wine per week     Comment: rare    Drug use: No          Medications:  (Not in a hospital admission)    Current Facility-Administered Medications   Medication Dose Route Frequency    sodium chloride (NS) flush 5-40 mL  5-40 mL IntraVENous Q8H    sodium chloride (NS) flush 5-40 mL  5-40 mL IntraVENous PRN    acetaminophen (TYLENOL) tablet 650 mg  650 mg Oral Q4H PRN    naloxone (NARCAN) injection 0.4 mg  0.4 mg IntraVENous PRN    ondansetron (ZOFRAN) injection 4 mg  4 mg IntraVENous Q4H PRN    enoxaparin (LOVENOX) injection 40 mg  40 mg SubCUTAneous Q24H    morphine injection 2 mg  2 mg IntraVENous Q4H PRN    pantoprazole (PROTONIX) tablet 40 mg  40 mg Oral ACB    [START ON 2019] aspirin chewable tablet 81 mg  81 mg Oral DAILY     Current Outpatient Medications   Medication Sig    esomeprazole (NEXIUM) 40 mg capsule Take 40 mg by mouth daily.  polyethylene glycol 3350 (MIRALAX PO) Take  by mouth.  potassium chloride (K-DUR, KLOR-CON) 10 mEq tablet Take 1 Tab by mouth two (2) times a day.  omeprazole (PRILOSEC) 20 mg capsule Take 20 mg by mouth two (2) times a day.  guaifenesin SR (MUCINEX) 600 mg SR tablet Take 1 Tab by mouth two (2) times daily as needed for Congestion.  BISACODYL (DULCOLAX PO) Take 1 Tab by mouth two (2) times a day.  loratadine (CLARITIN) 10 mg tablet Take 10 mg by mouth daily.  lactulose (CHRONULAC) 10 gram/15 mL solution Take 15 mL by mouth every eight (8) hours as needed for Other (constipation).  MULTIVITAMIN PO Take 1 Tab by mouth.     CALCIUM CARBONATE/VITAMIN D3 (CALCIUM 600 + D,3, PO) Take 1 Tab by mouth two (2) times a day. Review of Systems:  Pertinent Positive: left shoulder pain  Pertinent Negative: No dyspnea on exertion, shortness of breath, orthopnea, PND    All Other systems reviewed and are negative for a Comprehensive ROS (10+)        Physical Exam:  Visit Vitals  /75 (BP 1 Location: Left arm, BP Patient Position: At rest)   Pulse (!) 56   Temp 97.8 °F (36.6 °C)   Resp 17   SpO2 96%           Gen: Well-developed, well-nourished, in no acute distress  HEENT:  Pink conjunctivae, hearing intact to voice, moist mucous membranes  Neck: Supple,No JVD, No Carotid Bruit  Resp: No accessory muscle use, Clear breath sounds, No rales or rhonchi  Card: Normal Rate,Regular Rythm,Normal S1, S2, No murmurs, rubs or gallop. No thrills.    Abd:  Soft, non-tender, non-distended, normoactive bowel sounds are present   MSK: No cyanosis or clubbing  Skin: No rashes or ulcers  Neuro:  Cranial nerves are grossly intact, moving all four extremities, no focal deficit, follows commands appropriately  Psych:  Good insight, oriented to person, place and time, alert, Nml Affect  LE: No edema  Vascular:Distal Pulses 2+ and symmetric          EKG: sinus rhythmia, pvc, nsst changes    LABS:    Lab Results   Component Value Date/Time    WBC 5.0 12/22/2019 03:54 AM    HGB 10.2 (L) 12/22/2019 03:54 AM    HCT 32.8 (L) 12/22/2019 03:54 AM    PLATELET 911 (H) 73/06/2279 03:54 AM    MCV 87.2 12/22/2019 03:54 AM     Lab Results   Component Value Date/Time    Sodium 140 12/22/2019 03:54 AM    Potassium 3.7 12/22/2019 03:54 AM    Chloride 107 12/22/2019 03:54 AM    CO2 27 12/22/2019 03:54 AM    Anion gap 6 12/22/2019 03:54 AM    Glucose 104 (H) 12/22/2019 03:54 AM    BUN 8 12/22/2019 03:54 AM    Creatinine 0.79 12/22/2019 03:54 AM    BUN/Creatinine ratio 10 (L) 12/22/2019 03:54 AM    GFR est AA >60 12/22/2019 03:54 AM    GFR est non-AA >60 12/22/2019 03:54 AM    Calcium 8.9 12/22/2019 03:54 AM     Lab Results   Component Value Date/Time     (H) 10/28/2011 02:26 PM    CK-MB Index 1.1 10/28/2011 02:26 PM    Troponin-I, Qt. <0.05 12/22/2019 03:54 AM     No results found for: APTT  No results found for: INR, PTMR, PTP, PT1, PT2, INREXT        Sahara Lopez DO

## 2019-12-22 NOTE — H&P
[address]  [phone]    Admission History and Physical      NAME:              Hammad Myers   :   1950   MRN:  [id number]     PCP:  Shola Marroquin MD     Date:     2019     Chief  Complaint: Chest pain    History Of Presenting Illness:       Ms. Celine Menard is a 68 y.o. female who is being Observed for atypical chest pain. Ms. Celine Menard presented to our Emergency Department today complaining of having been woken up with severe left shoulder and chest pain. This radiated to her neck and back. No dizziness but she felt light headed. No strenuous exertion in recent days. She also denied any cough or fever. In the ED, a chest X ray done was unremarkable. EKG showed a junctional rhythm. Troponin and d-dimer were normal. She will be observed in hospital for further testing. Allergies   Allergen Reactions    Levaquin [Levofloxacin] Hives       Prior to Admission medications    Medication Sig Start Date End Date Taking? Authorizing Provider   esomeprazole (NEXIUM) 40 mg capsule Take 40 mg by mouth daily. Provider, Historical   polyethylene glycol 3350 (MIRALAX PO) Take  by mouth. Provider, Historical   potassium chloride (K-DUR, KLOR-CON) 10 mEq tablet Take 1 Tab by mouth two (2) times a day. 10/16/11   Luz Tejada MD   omeprazole (PRILOSEC) 20 mg capsule Take 20 mg by mouth two (2) times a day. Provider, Historical   guaifenesin SR (MUCINEX) 600 mg SR tablet Take 1 Tab by mouth two (2) times daily as needed for Congestion. 11   Tanmay Fermin MD   BISACODYL (DULCOLAX PO) Take 1 Tab by mouth two (2) times a day. Esteban Mcintyre MD   loratadine (CLARITIN) 10 mg tablet Take 10 mg by mouth daily.     Esteban Mcintyre MD   lactulose (CHRONULAC) 10 gram/15 mL solution Take 15 mL by mouth every eight (8) hours as needed for Other (constipation). 11   Atif Blankenship MD   MULTIVITAMIN PO Take 1 Tab by mouth. Provider, Historical   CALCIUM CARBONATE/VITAMIN D3 (CALCIUM 600 + D,3, PO) Take 1 Tab by mouth two (2) times a day. 11   Provider, Historical       Past Medical History:   Diagnosis Date    Arthritis     Degenerative disc disease     Gastrointestinal disorder     acid reflux    GERD (gastroesophageal reflux disease)     Pneumonia 2011        Past Surgical History:   Procedure Laterality Date    COLONOSCOPY N/A 2019    COLONOSCOPY performed by Vidhya Marie MD at [address] [facility] HX CHOLECYSTECTOMY      HX HYSTERECTOMY      HX OTHER SURGICAL      Previous GI bleed       Social History     Tobacco Use    Smoking status: Former Smoker     Last attempt to quit: 1987     Years since quittin.5    Smokeless tobacco: Never Used   Substance Use Topics    Alcohol use: Yes     Alcohol/week: 0.8 standard drinks     Types: 1 Glasses of wine per week     Comment: rare        Family History   Problem Relation Age of Onset    Cancer Father     Diabetes Mother     Cancer Brother         Review of Systems:    Constitutional ROS: no fever, chills, rigors or night sweats  Respiratory ROS: no cough, sputum, hemoptysis, dyspnea or pleuritic pain. Cardiovascular ROS: no palpitations, orthopnea, PND or syncope  Endocrine ROS: no polydispsia, polyuria, heat or cold intolerance or major weight change. Gastrointestinal ROS: no dysphagia, abdominal pain, nausea, vomiting or diarrhea    Genito-Urinary ROS: no dysuria, frequency, hematuria, retention or flank pain  Musculoskeletal ROS: no joint pain, swelling or muscular tenderness  Neurological ROS: no headache, confusion, focal weakness or any other neurological symptoms  Psychiatric ROS: + depression and anxiety.  No mood swings  Dermatological ROS: no rash, pruritis, or urticaria  Heme-Lymph ROS: no swollen glands, bleeding    Examination:    Constitutional:    Visit Vitals  /60   Pulse 61   Resp 22   SpO2 98%         General:  Weak and ill looking patient in no acute distress but anxious  Eyes: Pink conjunctivae, PERRLA with no discharge. Normal eye movements  Ear, Nose, Mouth & Throat: No ottorrhea, rhinorrhea, non tender sinuses, moist mucous membranes  Respiratory:  No accessory muscle use, clear breath sounds without crackles or wheezes  Cardiovascular:  No JVD or murmurs, regular and normal S1, S2 without thrills, bruits or peripheral edema. GI & :  Soft abdomen, non-tender, non-distended, normoactive bowel sounds with no palpable organomegaly  Heme:  No cervical or axillary adenopathy. Musculoskeletal: Shoulder and neck muscle discomfort. Good ROM left shoulder  Skin:  No rashes, bruising or ulcers   Neurological: Awake and alert, speech is clear, CNs 2-12 are grossly intact and otherwise non focal  Psychiatric:  Has a fair insight and is oriented x 3  ________________________________________________________________________    Data Review:    Labs:    Recent Labs     12/22/19  0354   WBC 5.0   HGB 10.2*   HCT 32.8*   *     Recent Labs     12/22/19  0354      K 3.7      CO2 27   *   BUN 8   CREA 0.79   CA 8.9   ALB 3.4*   SGOT 12*   ALT 19     No components found for: GLPOC  No results for input(s): PH, PCO2, PO2, HCO3, FIO2 in the last 72 hours. No results for input(s): INR, INREXT, INREXT in the last 72 hours.     Radiological Studies:      Chest X-ray - reviewed     Personally reviewed 12 lead EKG: junctional rhythm with no acute changes     Assessment & Impression:     Ms. Lakeisha Asif is a 68 y.o. female being evaluated for:     Principal Problem:    Atypical chest pain (12/22/2019)    Active Problems:    GERD (gastroesophageal reflux disease) (6/16/2011)      Depression with anxiety (9/11/2011)         Plan of management:    Atypical chest pain (12/22/2019): seems musculoskeletal. She nonetheless says she has a FHx of CAD. Observe in hospital. Cycle troponin. Get an Echo and consult cardiology. Asprin, NTG prn. Tylenol PRN for pain    GERD (gastroesophageal reflux disease) (6/16/2011): resume PPi    Depression with anxiety (9/11/2011): Not taking any home medications.  Monitor     Code Status:  Full    Surrogate decision maker: Family    Risk of deterioration: high      Total time spent for the care of the patient: [address] discussed with: Patient, Nursing Staff and ED physician    Discussed:  Code Status, Care Plan and D/C Planning    Prophylaxis:  Lovenox    Probable Disposition:  Home w/Family           ___________________________________________________    Attending Physician: Ventura Lowry MD

## 2019-12-22 NOTE — DISCHARGE SUMMARY
Hospitalist Discharge Summary     Patient ID:    Justin Connors  [id number]  68 y.o.  1950    Admit date: 12/22/2019    Discharge date and time: 12/22/2019    Admission Diagnoses: Atypical chest pain [R07.89]    Chronic Diagnoses:    Problem List as of 12/22/2019 Date Reviewed: 9/16/2011          Codes Class Noted - Resolved    * (Principal) Atypical chest pain ICD-10-CM: R07.89  ICD-9-CM: 786.59  12/22/2019 - Present        Depression with anxiety ICD-10-CM: F41.8  ICD-9-CM: 300.4  9/11/2011 - Present        Iron deficiency anemia, unspecified ICD-10-CM: D50.9  ICD-9-CM: 280.9  6/17/2011 - Present        GERD (gastroesophageal reflux disease) (Chronic) ICD-10-CM: K21.9  ICD-9-CM: 530.81  6/16/2011 - Present        RESOLVED: Hypopotassemia ICD-10-CM: E87.6  ICD-9-CM: 276.8  9/11/2011 - 9/16/2011        RESOLVED: Unspecified constipation ICD-10-CM: K59.00  ICD-9-CM: 564.00  9/11/2011 - 9/16/2011        RESOLVED: GI bleed ICD-10-CM: K92.2  ICD-9-CM: 578.9  9/11/2011 - 9/16/2011        RESOLVED: Abdominal pain, generalized ICD-10-CM: R10.84  ICD-9-CM: 789.07  6/16/2011 - 9/16/2011        RESOLVED: Nausea & vomiting ICD-10-CM: R11.2  ICD-9-CM: 787.01  6/16/2011 - 9/16/2011        RESOLVED: Hyposmolality and/or hyponatremia ICD-10-CM: E87.1  ICD-9-CM: 276.1  6/16/2011 - 9/16/2011              Discharge Medications:   Current Discharge Medication List      CONTINUE these medications which have NOT CHANGED    Details   esomeprazole (NEXIUM) 40 mg capsule Take 40 mg by mouth daily. polyethylene glycol 3350 (MIRALAX PO) Take  by mouth.      potassium chloride (K-DUR, KLOR-CON) 10 mEq tablet Take 1 Tab by mouth two (2) times a day. Qty: 20 Tab, Refills: 0      omeprazole (PRILOSEC) 20 mg capsule Take 20 mg by mouth two (2) times a day. guaifenesin SR (MUCINEX) 600 mg SR tablet Take 1 Tab by mouth two (2) times daily as needed for Congestion.   Qty: 30 Tab, Refills: 0      BISACODYL (DULCOLAX PO) Take 1 Tab by mouth two (2) times a day. loratadine (CLARITIN) 10 mg tablet Take 10 mg by mouth daily. lactulose (CHRONULAC) 10 gram/15 mL solution Take 15 mL by mouth every eight (8) hours as needed for Other (constipation). Qty: 480 mL, Refills: 0      MULTIVITAMIN PO Take 1 Tab by mouth. CALCIUM CARBONATE/VITAMIN D3 (CALCIUM 600 + D,3, PO) Take 1 Tab by mouth two (2) times a day. Follow up Care:    1. Suki Anderson MD in 1-2 weeks  2. Cardiology     Diet:  Cardiac Diet    Disposition:  Home. Advanced Directive:    Discharge Exam:  See today's note. CONSULTATIONS: Cardiology    Significant Diagnostic Studies:   Recent Labs     12/22/19  0354   WBC 5.0   HGB 10.2*   HCT 32.8*   *     Recent Labs     12/22/19  0354      K 3.7      CO2 27   BUN 8   CREA 0.79   *   CA 8.9     Recent Labs     12/22/19  0354   SGOT 12*   ALT 19   AP 64   TBILI 0.2   TP 7.6   ALB 3.4*   GLOB 4.2*     No results for input(s): INR, PTP, APTT, INREXT in the last 72 hours. No results for input(s): FE, TIBC, PSAT, FERR in the last 72 hours. No results for input(s): PH, PCO2, PO2 in the last 72 hours. No results for input(s): CPK, CKMB in the last 72 hours. No lab exists for component: TROPONINI  Lab Results   Component Value Date/Time    Glucose (POC) 113 (H) 06/12/2011 03:10 PM    Glucose (POC) 124 (H) 06/03/2011 10:58 PM    Glucose (POC) 109 (H) 05/02/2011 02:04 AM             HOSPITAL COURSE & TREATMENT RENDERED:   1. Atypical chest pain (12/22/2019): seems musculoskeletal. Cardiac enzymes are negative. The pain has resolved. Evaluated by cardiology and recommended outpatient cardiac workup.      2. GERD (gastroesophageal reflux disease) (6/16/2011): resume PPi     3. Depression with anxiety (9/11/2011): Not taking any home medications. Monitor            Discharged in improved condition.     Spent 15 minutes    Signed:  Mandi Cason MD  12/22/2019  1:14 PM

## 2019-12-22 NOTE — ED PROVIDER NOTES
Date of Service:  12/22/2019    Patient:  Connor Michael    Chief Complaint:  Arm Pain       HPI:  Connor Michael is a 68 y.o.  female who presents for evaluation of left arm pain. States that the left arm pain woke her up out of sleep. He describes it mostly in her shoulder with radiation across her chest and into her neck. 9 out of 10 when it woke her up, currently at 2 out of 10. She did not have any nausea or vomiting. She had some lightheadedness without dizziness. No chest pain specifically. No abdominal discomfort nausea vomiting diarrhea headaches fevers. She did have some chills. Patient states that she is never had pain like this before. She complains of some musculoskeletal type discomfort in her left shoulder but states that there is a different pain than what she is experiencing this evening nothing specific made the pain better or worse but it did kind of resolve slowly on its own.               Past Medical History:   Diagnosis Date    Arthritis     Degenerative disc disease     Gastrointestinal disorder     acid reflux    GERD (gastroesophageal reflux disease)     Pneumonia 05/2011       Past Surgical History:   Procedure Laterality Date    COLONOSCOPY N/A 11/26/2019    COLONOSCOPY performed by Feliberto Hudson MD at [facility] HX CHOLECYSTECTOMY  2011    HX HYSTERECTOMY      HX OTHER SURGICAL      Previous GI bleed         Family History:   Problem Relation Age of Onset    Cancer Father     Diabetes Mother     Cancer Brother        Social History     Socioeconomic History    Marital status: SINGLE     Spouse name: Not on file    Number of children: Not on file    Years of education: Not on file    Highest education level: Not on file   Occupational History    Not on file   Social Needs    Financial resource strain: Not on file    Food insecurity:     Worry: Not on file     Inability: Not on file    Transportation needs:     Medical: Not on file     Non-medical: Not on file   Tobacco Use    Smoking status: Former Smoker     Last attempt to quit: 1987     Years since quittin.5    Smokeless tobacco: Never Used   Substance and Sexual Activity    Alcohol use: Yes     Alcohol/week: 0.8 standard drinks     Types: 1 Glasses of wine per week     Comment: rare    Drug use: No    Sexual activity: Not on file   Lifestyle    Physical activity:     Days per week: Not on file     Minutes per session: Not on file    Stress: Not on file   Relationships    Social connections:     Talks on phone: Not on file     Gets together: Not on file     Attends Jewish service: Not on file     Active member of club or organization: Not on file     Attends meetings of clubs or organizations: Not on file     Relationship status: Not on file    Intimate partner violence:     Fear of current or ex partner: Not on file     Emotionally abused: Not on file     Physically abused: Not on file     Forced sexual activity: Not on file   Other Topics Concern    Not on file   Social History Narrative    Not on file         ALLERGIES: Levaquin [levofloxacin]    Review of Systems   Constitutional: Negative for fever. HENT: Negative for hearing loss. Eyes: Negative for visual disturbance. Respiratory: Negative for cough, shortness of breath and wheezing. Cardiovascular: Positive for chest pain. Negative for palpitations. Gastrointestinal: Negative for abdominal pain. Genitourinary: Negative for flank pain. Musculoskeletal: Negative for back pain. Skin: Negative for rash. Neurological: Negative for dizziness and light-headedness. Psychiatric/Behavioral: Negative for confusion. Vitals:    19 0341 19 0448   BP:  133/67   Pulse: 60 (!) 58   Resp: 12 13   SpO2: 100% 99%            Physical Exam  Nursing note reviewed. Constitutional:       General: She is not in acute distress. Appearance: She is well-developed.    HENT:      Head: Normocephalic and atraumatic. Nose: No congestion. Mouth/Throat:      Pharynx: No oropharyngeal exudate. Eyes:      General: No scleral icterus. Conjunctiva/sclera: Conjunctivae normal.      Pupils: Pupils are equal, round, and reactive to light. Neck:      Musculoskeletal: Neck supple. Vascular: No JVD. Trachea: No tracheal deviation. Cardiovascular:      Rate and Rhythm: Normal rate and regular rhythm. Heart sounds: No murmur. No gallop. Pulmonary:      Effort: Pulmonary effort is normal. No respiratory distress. Breath sounds: Normal breath sounds. Abdominal:      General: Bowel sounds are normal. There is no distension. Palpations: Abdomen is soft. Tenderness: There is no tenderness. Musculoskeletal: Normal range of motion. General: No tenderness or deformity. Comments: No reproduction of pain with movement of the left arm   Skin:     General: Skin is warm and dry. Capillary Refill: Capillary refill takes less than 2 seconds. Findings: No rash. Neurological:      Mental Status: She is alert and oriented to person, place, and time. Psychiatric:         Behavior: Behavior normal.         Thought Content:  Thought content normal.         Judgment: Judgment normal.          MDM      VITAL SIGNS:  Patient Vitals for the past 4 hrs:   Pulse Resp BP SpO2   12/22/19 0448 (!) 58 13 133/67 99 %   12/22/19 0341 60 12  100 %         LABS:  Recent Results (from the past 6 hour(s))   CBC WITH AUTOMATED DIFF    Collection Time: 12/22/19  3:54 AM   Result Value Ref Range    WBC 5.0 3.6 - 11.0 K/uL    RBC 3.76 (L) 3.80 - 5.20 M/uL    HGB 10.2 (L) 11.5 - 16.0 g/dL    HCT 32.8 (L) 35.0 - 47.0 %    MCV 87.2 80.0 - 99.0 FL    MCH 27.1 26.0 - 34.0 PG    MCHC 31.1 30.0 - 36.5 g/dL    RDW 13.1 11.5 - 14.5 %    PLATELET 210 (H) 732 - 400 K/uL    MPV 10.2 8.9 - 12.9 FL    NRBC 0.0 0  WBC    ABSOLUTE NRBC 0.00 0.00 - 0.01 K/uL    NEUTROPHILS 60 32 - 75 % LYMPHOCYTES 26 12 - 49 %    MONOCYTES 8 5 - 13 %    EOSINOPHILS 4 0 - 7 %    BASOPHILS 1 0 - 1 %    IMMATURE GRANULOCYTES 1 (H) 0.0 - 0.5 %    ABS. NEUTROPHILS 3.1 1.8 - 8.0 K/UL    ABS. LYMPHOCYTES 1.3 0.8 - 3.5 K/UL    ABS. MONOCYTES 0.4 0.0 - 1.0 K/UL    ABS. EOSINOPHILS 0.2 0.0 - 0.4 K/UL    ABS. BASOPHILS 0.1 0.0 - 0.1 K/UL    ABS. IMM. GRANS. 0.0 0.00 - 0.04 K/UL    DF AUTOMATED     METABOLIC PANEL, COMPREHENSIVE    Collection Time: 12/22/19  3:54 AM   Result Value Ref Range    Sodium 140 136 - 145 mmol/L    Potassium 3.7 3.5 - 5.1 mmol/L    Chloride 107 97 - 108 mmol/L    CO2 27 21 - 32 mmol/L    Anion gap 6 5 - 15 mmol/L    Glucose 104 (H) 65 - 100 mg/dL    BUN 8 6 - 20 MG/DL    Creatinine 0.79 0.55 - 1.02 MG/DL    BUN/Creatinine ratio 10 (L) 12 - 20      GFR est AA >60 >60 ml/min/1.73m2    GFR est non-AA >60 >60 ml/min/1.73m2    Calcium 8.9 8.5 - 10.1 MG/DL    Bilirubin, total 0.2 0.2 - 1.0 MG/DL    ALT (SGPT) 19 12 - 78 U/L    AST (SGOT) 12 (L) 15 - 37 U/L    Alk. phosphatase 64 45 - 117 U/L    Protein, total 7.6 6.4 - 8.2 g/dL    Albumin 3.4 (L) 3.5 - 5.0 g/dL    Globulin 4.2 (H) 2.0 - 4.0 g/dL    A-G Ratio 0.8 (L) 1.1 - 2.2     SAMPLES BEING HELD    Collection Time: 12/22/19  3:54 AM   Result Value Ref Range    SAMPLES BEING HELD SST,RD,TANGELA     COMMENT        Add-on orders for these samples will be processed based on acceptable specimen integrity and analyte stability, which may vary by analyte. TROPONIN I    Collection Time: 12/22/19  3:54 AM   Result Value Ref Range    Troponin-I, Qt. <0.05 <0.05 ng/mL        IMAGING:  XR CHEST PA LAT   Final Result   IMPRESSION:   1. No radiographic evidence of acute cardiopulmonary disease. Medications During Visit:  Medications   aspirin tablet 325 mg (has no administration in time range)         DECISION MAKING:  Joanie Mena is a 68 y.o. female who comes in as above. Here, labs and imaging are as above.   Patient has nonreproducible left shoulder and chest wall pain. Her work-up here does not show anything obviously acute but given her out her age and risk factors and her story her heart score is approximately 5. At this time patient will be brought in for 24-hour observation to the cardiac work-up. Disposition patient is agreeable to this plan. All questions answered. Pain resolved at DC. IMPRESSION:  1. Chest wall pain    2. Pain in joint of left shoulder        DISPOSITION:  Admitted        Procedures    Hospitalist Husam Text for Admission  5:47 AM    ED Room Number: QQ35/87  Patient Name and age:  Gregorio Wang 68 y.o.  female  Working Diagnosis:   1. Chest wall pain    2. Pain in joint of left shoulder      Readmission: no  Isolation Requirements:  no  Recommended Level of Care:  telemetry  Code Status:  Full  Other:  Left shoulder pain across chest. Woke from sleep. Normal work up. HEART score 5. 24 obs appropriate.    Department:[facility] ED - [phone]

## 2019-12-23 LAB
ATRIAL RATE: 80 BPM
CALCULATED P AXIS, ECG09: 43 DEGREES
CALCULATED R AXIS, ECG10: 75 DEGREES
CALCULATED T AXIS, ECG11: 38 DEGREES
DIAGNOSIS, 93000: NORMAL
P-R INTERVAL, ECG05: 118 MS
Q-T INTERVAL, ECG07: 398 MS
QRS DURATION, ECG06: 86 MS
QTC CALCULATION (BEZET), ECG08: 459 MS
VENTRICULAR RATE, ECG03: 80 BPM

## 2020-01-13 ENCOUNTER — DOCUMENTATION ONLY (OUTPATIENT)
Dept: CARDIOLOGY CLINIC | Age: 70
End: 2020-01-13

## 2020-01-13 NOTE — PROGRESS NOTES
Patient is scheduled for a 1 day lexiscan/cardiolite nuclear stress test 1/22/2020. Notes state stress test.  Please verify that lexiscan/cardiolite is the one you are requesting.   Thanks,

## 2020-01-13 NOTE — PROGRESS NOTES
Dr Estefania Motley please see below message I see in your consult note from their ED visit on 12/22/19 you are requesting a outpatient stress test but I do not see an order. Please advise.

## 2020-01-22 ENCOUNTER — OFFICE VISIT (OUTPATIENT)
Dept: CARDIOLOGY CLINIC | Age: 70
End: 2020-01-22

## 2020-01-22 ENCOUNTER — DOCUMENTATION ONLY (OUTPATIENT)
Dept: CARDIOLOGY CLINIC | Age: 70
End: 2020-01-22

## 2020-01-22 VITALS
SYSTOLIC BLOOD PRESSURE: 142 MMHG | HEART RATE: 63 BPM | WEIGHT: 136 LBS | HEIGHT: 62 IN | DIASTOLIC BLOOD PRESSURE: 70 MMHG | BODY MASS INDEX: 25.03 KG/M2 | OXYGEN SATURATION: 99 %

## 2020-01-22 DIAGNOSIS — D50.0 IRON DEFICIENCY ANEMIA DUE TO CHRONIC BLOOD LOSS: ICD-10-CM

## 2020-01-22 DIAGNOSIS — K21.9 GASTROESOPHAGEAL REFLUX DISEASE WITHOUT ESOPHAGITIS: ICD-10-CM

## 2020-01-22 DIAGNOSIS — R03.0 ELEVATED BLOOD PRESSURE READING IN OFFICE WITHOUT DIAGNOSIS OF HYPERTENSION: ICD-10-CM

## 2020-01-22 DIAGNOSIS — R07.89 ATYPICAL CHEST PAIN: Primary | ICD-10-CM

## 2020-01-22 NOTE — PROGRESS NOTES
Chief Complaint   Patient presents with    Follow-up     Patient was seen in the ER 12/22/19 for chest pains    Chest Pain     Visit Vitals  /70 (BP 1 Location: Left arm, BP Patient Position: Sitting)   Pulse 63   Ht 5' 2\" (1.575 m)   Wt 136 lb (61.7 kg)   SpO2 99%   BMI 24.87 kg/m²     Chest pain denied  SOB - denied  Dizziness denied  Swelling/Edema - lower extremities - ankles

## 2020-01-22 NOTE — PROGRESS NOTES
[facility] [address], [address] [facility], [address] [place] [address]    Office [phone],G [phone]           Malcolm Hopkins is a 68 y.o. female presents for f/up of chest pain      Assessment/Recommendations:      ICD-10-CM ICD-9-CM    1. Atypical chest pain R07.89 786.59    2. Iron deficiency anemia due to chronic blood loss D50.0 280.0    3. Gastroesophageal reflux disease without esophagitis K21.9 530.81    4. Elevated blood pressure reading in office without diagnosis of hypertension R03.0 796.2      Chest pain, atypical  lexiscan 1/22/2020 without evidence of ischemia    Family hx of cardiomyopathy (grandmother) and stroke (mom)  - obtain lipids from pcp    Elevated blood pressure- continue to monitor      Primary Care Physician- Bhargavi Rey MD    Follow-up 6 months    Subjective:  68 y.o. presents for f/up evaluation. Previously seen in the ED for evaluation of chest pain. Since initial consult she reports single episode of chest tightness while she was watching TV. She exercises with Silver Sneakers without any chest pain nor SOB. No prior hx of hypertension. Reports that her cholesterol is very well controlled. Past Medical History:   Diagnosis Date    Arthritis     Degenerative disc disease     Gastrointestinal disorder     acid reflux    GERD (gastroesophageal reflux disease)     Pneumonia 05/2011        Past Surgical History:   Procedure Laterality Date    COLONOSCOPY N/A 11/26/2019    COLONOSCOPY performed by Julia Pryor MD at [address] [facility] HX CHOLECYSTECTOMY  2011    HX HYSTERECTOMY      HX OTHER SURGICAL      Previous GI bleed         Current Outpatient Medications:     polyethylene glycol 3350 (MIRALAX PO), Take  by mouth., Disp: , Rfl:     guaifenesin SR (MUCINEX) 600 mg SR tablet, Take 1 Tab by mouth two (2) times daily as needed for Congestion. , Disp: 30 Tab, Rfl: 0    BISACODYL (DULCOLAX PO), Take 1 Tab by mouth two (2) times a day., Disp: , Rfl:     loratadine (CLARITIN) 10 mg tablet, Take 10 mg by mouth daily. , Disp: , Rfl:     lactulose (CHRONULAC) 10 gram/15 mL solution, Take 15 mL by mouth every eight (8) hours as needed for Other (constipation). , Disp: 480 mL, Rfl: 0    MULTIVITAMIN PO, Take 1 Tab by mouth., Disp: , Rfl:     CALCIUM CARBONATE/VITAMIN D3 (CALCIUM 600 + D,3, PO), Take 1 Tab by mouth two (2) times a day., Disp: , Rfl:     esomeprazole (NEXIUM) 40 mg capsule, Take 40 mg by mouth daily. , Disp: , Rfl:     potassium chloride (K-DUR, KLOR-CON) 10 mEq tablet, Take 1 Tab by mouth two (2) times a day. (Patient not taking: Reported on 2020), Disp: 20 Tab, Rfl: 0  No current facility-administered medications for this visit. Allergies   Allergen Reactions    Levaquin [Levofloxacin] Hives        Family History   Problem Relation Age of Onset    Cancer Father     Diabetes Mother     Cancer Brother        Social History     Tobacco Use    Smoking status: Former Smoker     Last attempt to quit: 1987     Years since quittin.6    Smokeless tobacco: Never Used   Substance Use Topics    Alcohol use: Yes     Alcohol/week: 0.8 standard drinks     Types: 1 Glasses of wine per week     Comment: rare    Drug use: No       Review of Symptoms:  Pertinent Positive: negative  Pertinent Negative:No chest pain, dyspnea on exertion, shortness of breath, orthopnea, PND    All Other systems reviewed and are negative for a Comprehensive ROS (10+)    Physical Exam    Blood pressure 142/70, pulse 63, height 5' 2\" (1.575 m), weight 136 lb (61.7 kg), SpO2 99 %. Constitutional:  well-developed and well-nourished. No distress. HENT: Normocephalic. Eyes: No scleral icterus. Neck:  Neck supple. No JVD present. Pulmonary/Chest: Effort normal and breath sounds normal. No respiratory distress, wheezes or rales. Cardiovascular: Normal rate, regular rhythm, S1 S2 .  Exam reveals no gallop and no friction rub. No murmur heard. No edema. Extremities:  Normal muscle tone  Abdominal:   No abnormal distension. Neurological:  Moving all extremities, cranial nerves appear grossly intact. Skin: Skin is not cold. Not diaphoretic. No erythema. Psychiatric:  Grossly normal mood and affect. Intact insight. Objective Data:    Lexiscan cardiolite: 1/2020. Normal myocardial perfusion.                 Wagner Covarrubias DO

## 2020-08-05 ENCOUNTER — OFFICE VISIT (OUTPATIENT)
Dept: CARDIOLOGY CLINIC | Age: 70
End: 2020-08-05
Payer: MEDICARE

## 2020-08-05 VITALS
HEART RATE: 67 BPM | DIASTOLIC BLOOD PRESSURE: 76 MMHG | HEIGHT: 62 IN | BODY MASS INDEX: 24.48 KG/M2 | WEIGHT: 133 LBS | OXYGEN SATURATION: 99 % | SYSTOLIC BLOOD PRESSURE: 132 MMHG

## 2020-08-05 DIAGNOSIS — D50.0 IRON DEFICIENCY ANEMIA DUE TO CHRONIC BLOOD LOSS: ICD-10-CM

## 2020-08-05 DIAGNOSIS — R07.9 CHEST PAIN, UNSPECIFIED TYPE: Primary | ICD-10-CM

## 2020-08-05 PROCEDURE — 99213 OFFICE O/P EST LOW 20 MIN: CPT | Performed by: STUDENT IN AN ORGANIZED HEALTH CARE EDUCATION/TRAINING PROGRAM

## 2020-08-05 RX ORDER — LANOLIN ALCOHOL/MO/W.PET/CERES
CREAM (GRAM) TOPICAL
COMMUNITY
Start: 2020-05-05

## 2020-08-05 NOTE — PROGRESS NOTES
Esha Schneider is a 68 y.o. female    Chief Complaint   Patient presents with    Chest Pain    Follow-up     elevated BP Iron deficiency       Chest pain No    SOB No    Dizziness No    Swelling No    Refills No    Visit Vitals  /76 (BP 1 Location: Left arm, BP Patient Position: Sitting)   Pulse 67   Ht 5' 2\" (1.575 m)   Wt 133 lb (60.3 kg)   SpO2 99%   BMI 24.33 kg/m²       1. Have you been to the ER, urgent care clinic since your last visit? Hospitalized since your last visit? no    2. Have you seen or consulted any other health care providers outside of the [address] [place] [address] since your last visit? Include any pap smears or colon screening.   no

## 2020-08-05 NOTE — LETTER
8/7/20 Patient: Adalid Tamez YOB: 1950 Date of Visit: 8/5/2020 Roxana Thomas MD 
[address] [address] VIA Facsimile: [phone] Dear Roxana Thomas MD, Thank you for referring Ms. Karen Kessler to [facility] for evaluation. My notes for this consultation are attached. If you have questions, please do not hesitate to call me. I look forward to following your patient along with you.  
 
 
Sincerely, 
 
Richardson Bermeo, DO

## 2020-08-05 NOTE — PROGRESS NOTES
[facility] [address], [address] [facility], [address] [place] [address]    Office [phone],WQ [phone]           Leslie Lozada is a 68 y.o. female presents for f/up of chest pain      Assessment/Recommendations:    Chest pain, atypical  lexiscan 1/22/2020 without evidence of ischemia    Family hx of cardiomyopathy (grandmother) and stroke (mom)  - continue lipid mgmt by Fausto Fuentes MD          Primary Care Physician- Fausto Fuentes MD    Follow-up as needed    Subjective:  68 y.o. presents for f/up evaluation. Previously seen in the ED for evaluation of chest pain. Previous stress test w/o ischemia. Remains active with no further CP symptoms. Past Medical History:   Diagnosis Date    Arthritis     Degenerative disc disease     Gastrointestinal disorder     acid reflux    GERD (gastroesophageal reflux disease)     Pneumonia 05/2011        Past Surgical History:   Procedure Laterality Date    COLONOSCOPY N/A 11/26/2019    COLONOSCOPY performed by Aj Kulkarni MD at [address] [facility] HX CHOLECYSTECTOMY  2011    HX HYSTERECTOMY      HX OTHER SURGICAL      Previous GI bleed         Current Outpatient Medications:     ferrous sulfate 325 mg (65 mg iron) tablet, TK 1 T PO BID, Disp: , Rfl:     esomeprazole (NEXIUM) 40 mg capsule, Take 40 mg by mouth daily. , Disp: , Rfl:     polyethylene glycol 3350 (MIRALAX PO), Take  by mouth., Disp: , Rfl:     guaifenesin SR (MUCINEX) 600 mg SR tablet, Take 1 Tab by mouth two (2) times daily as needed for Congestion. , Disp: 30 Tab, Rfl: 0    BISACODYL (DULCOLAX PO), Take 1 Tab by mouth two (2) times a day., Disp: , Rfl:     loratadine (CLARITIN) 10 mg tablet, Take 10 mg by mouth daily. , Disp: , Rfl:     MULTIVITAMIN PO, Take 1 Tab by mouth., Disp: , Rfl:     CALCIUM CARBONATE/VITAMIN D3 (CALCIUM 600 + D,3, PO), Take 1 Tab by mouth two (2) times a day., Disp: , Rfl:     potassium chloride (K-DUR, KLOR-CON) 10 mEq tablet, Take 1 Tab by mouth two (2) times a day. (Patient not taking: Reported on 2020), Disp: 20 Tab, Rfl: 0    lactulose (CHRONULAC) 10 gram/15 mL solution, Take 15 mL by mouth every eight (8) hours as needed for Other (constipation). (Patient not taking: Reported on 2020), Disp: 480 mL, Rfl: 0    Allergies   Allergen Reactions    Levaquin [Levofloxacin] Hives        Family History   Problem Relation Age of Onset    Cancer Father     Diabetes Mother     Cancer Brother        Social History     Tobacco Use    Smoking status: Former Smoker     Last attempt to quit: 1987     Years since quittin.1    Smokeless tobacco: Never Used   Substance Use Topics    Alcohol use: Not Currently     Alcohol/week: 0.8 standard drinks     Types: 1 Glasses of wine per week    Drug use: No       Review of Symptoms:  Pertinent Positive: negative  Pertinent Negative:No chest pain, dyspnea on exertion, shortness of breath, orthopnea, PND    All Other systems reviewed and are negative for a Comprehensive ROS (10+)    Physical Exam    Blood pressure 132/76, pulse 67, height 5' 2\" (1.575 m), weight 133 lb (60.3 kg), SpO2 99 %. Constitutional:  well-developed and well-nourished. No distress. HENT: Normocephalic. Eyes: No scleral icterus. Neck:  Neck supple. No JVD present. Pulmonary/Chest: Effort normal and breath sounds normal. No respiratory distress, wheezes or rales. Cardiovascular: Normal rate, regular rhythm, S1 S2 . Exam reveals no gallop and no friction rub. No murmur heard. No edema. Extremities:  Normal muscle tone  Abdominal:   No abnormal distension. Neurological:  Moving all extremities, cranial nerves appear grossly intact. Skin: Skin is not cold. Not diaphoretic. No erythema. Psychiatric:  Grossly normal mood and affect. Intact insight. Objective Data:    Lexiscan cardiolite: 2020. Normal myocardial perfusion.                 Leila Lowery DO

## 2021-01-15 NOTE — PERIOP NOTES
[address]  Endoscopy Preprocedure Instructions      1. On the day of your surgery, please report to registration located on the 2nd floor of the  [facility]. yes    2. You must have a responsible adult to drive you to the hospital, stay at the hospital during your procedure and drive you home. If they leave your procedure will not be started (no exceptions). yes    3. Do not have anything to eat or drink (including water, gum, mints, coffee, and juice) after midnight. This does not apply to the medications you were instructed to take by your physician. yesIf you are currently taking Plavix, Coumadin, Aspirin, or other blood-thinning agents, contact your physician for special instructions. not applicable. 4. If you are having a procedure that requires bowel prep: We recommend that you have only clear liquids the day before your procedure and begin your bowel prep by 5:00 pm.  You may continue to drink clear liquids until midnight. If for any reason you are not able to complete your prep please notify your physician immediately. not applicable    5. Have a list of all current medications, including vitamins, herbal supplements and any other over the counter medications. yes    6. If you wear glasses, contacts, dentures and/or hearing aids, they may be removed prior to procedure, please bring a case to store them in. yes    7. You should understand that if you do not follow these instructions your procedure may be cancelled. If your physical condition changes (I.e. fever, cold or flu) please contact your doctor as soon as possible. 8. It is important that you be on time.   If for any reason you are unable to keep your appointment please call [phone] the day of or your physicians office prior to your scheduled procedure

## 2021-01-18 ENCOUNTER — TRANSCRIBE ORDER (OUTPATIENT)
Dept: REGISTRATION | Age: 71
End: 2021-01-18

## 2021-01-18 ENCOUNTER — HOSPITAL ENCOUNTER (OUTPATIENT)
Dept: LAB | Age: 71
Discharge: HOME | End: 2021-01-18
Payer: MEDICARE

## 2021-01-18 DIAGNOSIS — Z01.812 PRE-PROCEDURAL LABORATORY EXAMINATIONS: ICD-10-CM

## 2021-01-18 DIAGNOSIS — Z01.812 PRE-PROCEDURAL LABORATORY EXAMINATIONS: Primary | ICD-10-CM

## 2021-01-18 PROCEDURE — U0003 INFECTIOUS AGENT DETECTION BY NUCLEIC ACID (DNA OR RNA); SEVERE ACUTE RESPIRATORY SYNDROME CORONAVIRUS 2 (SARS-COV-2) (CORONAVIRUS DISEASE [COVID-19]), AMPLIFIED PROBE TECHNIQUE, MAKING USE OF HIGH THROUGHPUT TECHNOLOGIES AS DESCRIBED BY CMS-2020-01-R: HCPCS

## 2021-01-19 LAB — SARS-COV-2, COV2NT: NOT DETECTED

## 2021-01-22 ENCOUNTER — ANESTHESIA EVENT (OUTPATIENT)
Dept: ENDOSCOPY | Age: 71
End: 2021-01-22
Payer: MEDICARE

## 2021-01-22 ENCOUNTER — ANESTHESIA (OUTPATIENT)
Dept: ENDOSCOPY | Age: 71
End: 2021-01-22
Payer: MEDICARE

## 2021-01-22 ENCOUNTER — HOSPITAL ENCOUNTER (OUTPATIENT)
Age: 71
Setting detail: OUTPATIENT SURGERY
Discharge: HOME | End: 2021-01-22
Attending: SPECIALIST | Admitting: SPECIALIST
Payer: MEDICARE

## 2021-01-22 VITALS
SYSTOLIC BLOOD PRESSURE: 134 MMHG | HEIGHT: 62 IN | WEIGHT: 122.36 LBS | TEMPERATURE: 97.9 F | HEART RATE: 58 BPM | OXYGEN SATURATION: 100 % | RESPIRATION RATE: 22 BRPM | DIASTOLIC BLOOD PRESSURE: 66 MMHG | BODY MASS INDEX: 22.52 KG/M2

## 2021-01-22 PROCEDURE — 2709999900 HC NON-CHARGEABLE SUPPLY: Performed by: SPECIALIST

## 2021-01-22 PROCEDURE — 77030022875 HC PRB AR PLSM COAG ERBE -C: Performed by: SPECIALIST

## 2021-01-22 PROCEDURE — 76040000019: Performed by: SPECIALIST

## 2021-01-22 PROCEDURE — 76060000031 HC ANESTHESIA FIRST 0.5 HR: Performed by: SPECIALIST

## 2021-01-22 PROCEDURE — 74011250636 HC RX REV CODE- 250/636: Performed by: NURSE ANESTHETIST, CERTIFIED REGISTERED

## 2021-01-22 PROCEDURE — 74011250636 HC RX REV CODE- 250/636: Performed by: SPECIALIST

## 2021-01-22 RX ORDER — FENTANYL CITRATE 50 UG/ML
25 INJECTION, SOLUTION INTRAMUSCULAR; INTRAVENOUS AS NEEDED
Status: DISCONTINUED | OUTPATIENT
Start: 2021-01-22 | End: 2021-01-22 | Stop reason: HOSPADM

## 2021-01-22 RX ORDER — PROPOFOL 10 MG/ML
INJECTION, EMULSION INTRAVENOUS
Status: DISCONTINUED | OUTPATIENT
Start: 2021-01-22 | End: 2021-01-22 | Stop reason: HOSPADM

## 2021-01-22 RX ORDER — FLUMAZENIL 0.1 MG/ML
0.2 INJECTION INTRAVENOUS
Status: DISCONTINUED | OUTPATIENT
Start: 2021-01-22 | End: 2021-01-22 | Stop reason: HOSPADM

## 2021-01-22 RX ORDER — NALOXONE HYDROCHLORIDE 0.4 MG/ML
0.4 INJECTION, SOLUTION INTRAMUSCULAR; INTRAVENOUS; SUBCUTANEOUS
Status: DISCONTINUED | OUTPATIENT
Start: 2021-01-22 | End: 2021-01-22 | Stop reason: HOSPADM

## 2021-01-22 RX ORDER — DEXTROMETHORPHAN/PSEUDOEPHED 2.5-7.5/.8
1.2 DROPS ORAL
Status: DISCONTINUED | OUTPATIENT
Start: 2021-01-22 | End: 2021-01-22 | Stop reason: HOSPADM

## 2021-01-22 RX ORDER — MIDAZOLAM HYDROCHLORIDE 1 MG/ML
.25-5 INJECTION, SOLUTION INTRAMUSCULAR; INTRAVENOUS AS NEEDED
Status: DISCONTINUED | OUTPATIENT
Start: 2021-01-22 | End: 2021-01-22 | Stop reason: HOSPADM

## 2021-01-22 RX ORDER — SODIUM CHLORIDE 9 MG/ML
50 INJECTION, SOLUTION INTRAVENOUS CONTINUOUS
Status: DISCONTINUED | OUTPATIENT
Start: 2021-01-22 | End: 2021-01-22 | Stop reason: HOSPADM

## 2021-01-22 RX ORDER — SODIUM CHLORIDE 9 MG/ML
INJECTION, SOLUTION INTRAVENOUS
Status: DISCONTINUED | OUTPATIENT
Start: 2021-01-22 | End: 2021-01-22 | Stop reason: HOSPADM

## 2021-01-22 RX ADMIN — SODIUM CHLORIDE: 9 INJECTION, SOLUTION INTRAVENOUS at 07:21

## 2021-01-22 RX ADMIN — PROPOFOL 200 MCG/KG/MIN: 10 INJECTION, EMULSION INTRAVENOUS at 07:28

## 2021-01-22 NOTE — ANESTHESIA PREPROCEDURE EVALUATION
Relevant Problems   No relevant active problems       Anesthetic History   No history of anesthetic complications            Review of Systems / Medical History  Patient summary reviewed and nursing notes reviewed    Pulmonary  Within defined limits                 Neuro/Psych   Within defined limits           Cardiovascular                  Exercise tolerance: >4 METS     GI/Hepatic/Renal     GERD           Endo/Other        Arthritis and anemia     Other Findings   Comments: GI bleed recently. Colonic a-v malformations, previously noted on endoscopy. Physical Exam    Airway  Mallampati: II    Neck ROM: normal range of motion   Mouth opening: Normal     Cardiovascular    Rhythm: regular  Rate: normal         Dental         Pulmonary  Breath sounds clear to auscultation               Abdominal         Other Findings            Anesthetic Plan    ASA: 2  Anesthesia type: MAC            Anesthetic plan and risks discussed with: Patient      Informed consent obtained.

## 2021-01-22 NOTE — PROGRESS NOTES
dalen Dose  1950  [id number]    Situation:  Verbal report received from:   Omdi Melo RN   Procedure: Procedure(s):  ESOPHAGOGASTRODUODENOSCOPY (EGD)  ENDOSCOPIC ARGON PLASMA COAGULATION    Background:    Preoperative diagnosis: HERNANDEZ  GASTROINTESTNIAL HEMORRHAGE  Postoperative diagnosis: Duodenum AVM's    :  Dr. Renzo Shea   Assistant(s): Endoscopy Technician-1: Elaine Rodrigues  Endoscopy RN-1: Анна Abdullahi RN    Specimens: * No specimens in log *  H. Pylori  no    Assessment:  Intra-procedure medications       Anesthesia gave intra-procedure sedation and medications, see anesthesia flow sheet yes    Intravenous fluids: NS@ KVO     Vital signs stable   yes    Abdominal assessment: round and soft   yes    Recommendation:  Discharge patient per MD order  yes.   Return to floor  outpatient  Family or Friend   Friend   Permission to share finding with family or friend yes

## 2021-01-22 NOTE — DISCHARGE INSTRUCTIONS
[address] [facility] CONRAD Krause MD  [phone]      January 22, 2021     Nilo Eugene 144: 1950    ENDOSCOPY DISCHARGE INSTRUCTIONS    If there is redness at IV site you should apply warm compress to area. If redness or soreness persist contact Dr. Chalino Krause' or your primary care doctor. Gaseous discomfort may develop, but walking, belching will help relieve this. You may not operate a vehicle for 12 hours  You may not operate machinery or dangerous appliances for rest of today  You may not drink alcoholic beverages for 12 hours  Avoid making any critical decisions for 24 hours    DIET:  You may resume your normal diet, but some patients find that heavy or large meals may lead to indigestion or vomiting. I suggest a light meal as first food intake. MEDICATIONS:  The use of some over-the-counter pain medication may lead to bleeding after biopsies or other procedures you may have had done. Tylenol (also called acetaminophen) is safe to take and will not lead to bleeding. Based on the procedure you had today you may not safely take aspirin or aspirin-like products for the next ten (10) days. ACTIVITY:  You may resume your normal household activities, but it is recommended that you spend the remainder of the day resting -  avoid any strenuous activity. CALL DR. Fredy Cabot' OFFICE IF:  Increasing pain, nausea, vomiting  Abdominal distension (swelling)  Significant new or increased bleeding (oral or rectal)  Fever/Chills  Chest pain/shortness of breath                   Additional instructions:   No aspirin 10 days. We found several AVM of the upper bowel and we cauterized them. I want you to see your PCP and get another blood count in about 8 weeks to see if this has helped. It was an honor to be your doctor today. Please let me or my office staff know if you have any feedback about today's procedure. Raz Condon MD

## 2021-01-22 NOTE — INTERVAL H&P NOTE
Pre-Endoscopy H&P Update Chief complaint/HPI/ROS:  The indication for the procedure, the patient's history and the patient's current medications are reviewed prior to the procedure and that data is reported on the H&P to which this document is attached. Any significant complaints with regard to organ systems will be noted, and if not mentioned then a review of systems is not contributory. Past Medical History:  
Diagnosis Date Arthritis Degenerative disc disease Gastrointestinal disorder   
 acid reflux GERD (gastroesophageal reflux disease) Past Surgical History:  
Procedure Laterality Date COLONOSCOPY N/A 2019 COLONOSCOPY performed by Ha Campos MD at [address] [facility]   HX GI    
 cholecystectomy HX HYSTERECTOMY HX OTHER SURGICAL Previous GI bleed Social  
Social History Tobacco Use Smoking status: Former Smoker Quit date: 1987 Years since quittin.6 Smokeless tobacco: Never Used Substance Use Topics Alcohol use: Not Currently Alcohol/week: 0.8 standard drinks Types: 1 Glasses of wine per week Family History Problem Relation Age of Onset Cancer Father   
     lung Diabetes Mother Cancer Brother   
     brain mets to lungs Allergies Allergen Reactions Levaquin [Levofloxacin] Hives Prior to Admission Medications Prescriptions Last Dose Informant Patient Reported? Taking? BISACODYL (DULCOLAX PO) 2021 at Unknown time  Yes Yes Sig: Take 1 Tab by mouth two (2) times a day. CALCIUM CARBONATE/VITAMIN D3 (CALCIUM 600 + D,3, PO) 2021  Yes Yes Sig: Take 1 Tab by mouth two (2) times a day. MULTIVITAMIN PO 2021  Yes Yes Sig: Take 1 Tab by mouth.  
esomeprazole (NEXIUM) 40 mg capsule 2021  Yes Yes Sig: Take 40 mg by mouth daily. ferrous sulfate 325 mg (65 mg iron) tablet 2021  Yes Yes Sig: TK 1 T PO BID  
 guaifenesin SR (MUCINEX) 600 mg SR tablet Unknown at Unknown time  No No  
Sig: Take 1 Tab by mouth two (2) times daily as needed for Congestion. lactulose (CHRONULAC) 10 gram/15 mL solution Not Taking at Unknown time  No No  
Sig: Take 15 mL by mouth every eight (8) hours as needed for Other (constipation). Patient not taking: Reported on 8/5/2020  
loratadine (CLARITIN) 10 mg tablet Unknown at Unknown time  Yes No  
Sig: Take 10 mg by mouth daily. polyethylene glycol 3350 (MIRALAX PO) Unknown at Unknown time  Yes No  
Sig: Take  by mouth.  
potassium chloride (K-DUR, KLOR-CON) 10 mEq tablet Not Taking at Unknown time  No No  
Sig: Take 1 Tab by mouth two (2) times a day. Patient not taking: Reported on 1/22/2020 Facility-Administered Medications: None PHYSICAL EXAM:  The patient is examined immediately prior to the procedure. Visit Vitals BP (!) 112/58 Pulse 65 Temp 97.9 °F (36.6 °C) Resp 22 Ht 5' 2\" (1.575 m) Wt 55.5 kg (122 lb 5.7 oz) SpO2 100% Breastfeeding No  
BMI 22.38 kg/m² Gen: Appears comfortable, no distress. Pulm: comfortable respirations with no abnormal audible breath sounds HEART: well perfused, no abnormal audible heart sounds GI: abdomen flat. PLAN:  Informed consent discussion held, patient afforded an opportunity to ask questions and all questions answered. After being advised of the risks, benefits, and alternatives, the patient requested that we proceed and indicated so on a written consent form. Will proceed with procedure as planned.  
Lazarus Midget, MD

## 2021-01-22 NOTE — H&P
Date: 2020 1:00 PM   Patient Name: Wilbert Boss   Account #: [id number]    Gender: Female    (age): 1950 (68)       Provider:     Mary Garay. Lul Acevedo MD        Referring Physician:     Charmaine Smith MD   [address]  [phone] (phone)  [phone] (fax)        Chief Complaint: Anemia, iron deficiency           History of Present Illness:   68 email last seen 2019 for iron deficiency anemia. She underwent upper endoscopy and colonoscopy which revealed normal upper GI exam, but to AVMs of the right colon. At the time of the colonoscopy 1 of those was bleeding. Both were ablated. Most recent laboratory studies reveal hemoglobin of 10 with low ferritin and positive occult blood. She occasionally sees dark stool. She denies abdominal pain, feeding intolerance, change in bowel habits. I suggested repeat small intestinal wireless video capsule enteroscopy to look to see if there are small bowel AVMs. Will consider repeat colonoscopy based on results. ? 68 email last seen 2019 for iron deficiency anemia. She underwent upper endoscopy and colonoscopy which revealed normal upper GI exam, but to AVMs of the right colon. At the time of the colonoscopy 1 of those was bleeding. Both were ablated. Most recent laboratory studies reveal hemoglobin of 10 with low ferritin and positive occult blood. She occasionally sees dark stool. She denies abdominal pain, feeding intolerance, change in bowel habits. I suggested repeat small intestinal wireless video capsule enteroscopy to look to see if there are small bowel AVMs. Will consider repeat colonoscopy based on results.  ?       Past Medical History      Medical Conditions: acid reflux  Anemia  Arthritis  constipation  Glaucoma   Surgical Procedures: Gallbladder surgery, 2011  Hysterectomy, 1992   Dx Studies: Abdominal U/S,   Barium Swallow,   CAT Scan,   Colonoscopy, 11/26/2019  Colonoscopy, 11/26/2019  EGD, 11/26/2019  Egd With Bravo Placement, 8/5/2013, No Esophagitis Was Present, No Hiatal Hernia Was Noted and No Cause For Symptoms of excess salivation or reflux Was Found. Pre-Procedure Call, 7/2/2013   Medications: esomeprazole magnesium 40 mg Take 1 capsule by mouth once a day as directed  ferrous sulfate 325 mg (65 mg iron) TK 1 T PO BID  latanoprost 0.005% Instill as directed   Allergies: hydroxyzine HCl  Levaquin - rash   Immunizations: Influenza, seasonal, injectable, 11/01/2020  Pneumococcal conjugate PCV 13, 2017      Social History      Alcohol: None   Tobacco: Former smokerCigarettes 10 cigarettes a day, quit 1987. Drugs: None   Exercise: Exercise 3 or more times a week. Walking/ Running. Caffeine: None   Marital Status:          Occupation:               Family History No history of Celiac sprue, Colon Cancer, Colon Polyps, Esophageal Cancer, Esophogeal Cancer, IBD (Crohn's or UC), Inflammatory bowel disease (Crohn's or Ulcerative Colitis), Liver disease, Stomach Cancer  Other: Diagnosed with Stomach cancer; Father: Diagnosed with GI cancers; Review of Systems:   Cardiovascular: Denies chest pain, irregular heart beat, palpitations, peripheral edema, syncope, Sweats. Constitutional: Denies fatigue, fever, loss of appetite, weight gain, weight loss. ENMT: Denies nose bleeds, sore throat, hearing loss. Endocrine: Denies excessive thirst, heat intolerance. Eyes: Denies loss of vision. Gastrointestinal: Denies abdominal pain, abdominal swelling, change in bowel habits, constipation, diarrhea, Bloating/gas, heartburn, jaundice, nausea, rectal bleeding, stomach cramps, vomiting, dysphagia, rectal pain, Stool incontinence, hematemesis. Genitourinary: Denies dark urine, dysuria, frequent urination, hematuria, incontinence. Hematologic/Lymphatic: Denies easy bruising, prolonged bleeding. Integumentary: Denies itching, rashes, sun sensitivity. Musculoskeletal: Denies arthritis, back pain, gout, joint pain, muscle weakness, stiffness. Neurological: Denies dizziness, fainting, frequent headaches, memory loss. Psychiatric: Denies anxiety, depression, difficulty sleeping, hallucinations, nervousness, panic attacks, paranoia. Respiratory: Denies cough, dyspnea, wheezing. Vital Signs:   BP  (mmHg)  Pulse  (ppm) Weight (lbs/oz) Height (ft/in) BMI Temp   112/62 72 131 /  5 / 2 23.96 97 (F)      Physical Exam:   Constitutional:      Appearance: No distress, appears comfortable. Communication: Understands/receives spoken information. Skin:      Inspection: No rash, no jaundice. Head/face: Inspection: Normacephalic, atraumatic. Eyes:      Conjunctivae/lids: Normal.   Pupils/irises: Pupils equal, round and normal.   ENMT:      External: Normal.   Hearing: Normal.   Neck:      Neck: Normal appearance, trachea midline. Jugular veins: No JVD noted. Respiratory:      Effort: Normal respiratory effort, comfortable, speaks in complete sentences. Auscultation: normal breath sounds, no rubs, wheezes or rhonchi. Gastrointestinal/Abdomen:      Abdomen: non-distended, nontender. Liver/Spleen: normal, normal size, Liver size and consistency normal, spleen is non-palpable. Musculoskeletal:      Gait/station: normal.   Digits/nails: Normal, no spooning of nails, clubbing, or splinter hemorrhages, no clubbing, cyanosis, petechiae or other inflammatory conditions. Psychiatric:      Judgment/insight: Normal, normal judgement, normal insight. Orientation: oriented to time, space and person. Lymphatic:      Neck: No lymphadenopathy in the cervical or supraclavicular chain. Other: No periumbilic lymphadenopathy. Lab Results:      Test 11/26/2019 Units Limits   Hemoglobin, Poc      Hemoglobin, POC 9.2 g/dL 11.5 - 16     Impressions: Iron deficiency anemia  Occult blood in stools? ?Iron deficiency anemia? ?  ? ? Occult blood in stools? Assessment: ?         Plan: Capsule Endoscopy? ?Capsule Endoscopy? Risk & Medical Necessity: The patient requires Low to Moderate Severity care for this visit. Diagnosis and management options are Multiple. The amount of data reviewed and/or ordered is Minimal/None. The level of risk is Low. Notes:              Valiant Ganser. Sunshine Aguirre MD     Electronically signed on 2020 1:42:34 PM by Valiant Ganser.  Giselle Larios , MRN [id number],  1950 First Visit, Monday, 2020                                                                                                                                                        New     Modify          Delete     Delete all     Edit Wording          Sign     [email or web address]

## 2021-01-22 NOTE — ANESTHESIA POSTPROCEDURE EVALUATION
Procedure(s):  ESOPHAGOGASTRODUODENOSCOPY (EGD)  ENDOSCOPIC ARGON PLASMA COAGULATION. MAC    Anesthesia Post Evaluation      Multimodal analgesia: multimodal analgesia not used between 6 hours prior to anesthesia start to PACU discharge  Patient location during evaluation: PACU  Patient participation: complete - patient participated  Level of consciousness: awake and alert  Pain score: 0  Airway patency: patent  Anesthetic complications: no  Cardiovascular status: acceptable  Respiratory status: acceptable  Hydration status: acceptable  Post anesthesia nausea and vomiting:  none  Final Post Anesthesia Temperature Assessment:  Normothermia (36.0-37.5 degrees C)      INITIAL Post-op Vital signs:   Vitals Value Taken Time   /66 01/22/21 0802   Temp 36.6 °C (97.9 °F) 01/22/21 0748   Pulse 59 01/22/21 0806   Resp 17 01/22/21 0806   SpO2 100 % 01/22/21 0806   Vitals shown include unvalidated device data.

## 2021-01-22 NOTE — PROCEDURES
[address] [place], [place]  [phone]      2021    Esophagogastroduodenoscopy (EGD) Procedure Note  Adalberto Salvador  : 1950  [facility] Medical Record Number: [id number]      Indications:    Anemia , Pillcam shows proximal small intestinal AVM. Referring Physician:  Siobhan Brice MD  Anesthesia/Sedation:  Conscious sedation/deep sedation/monitored anesthesia -- see notes. Endoscopist:  Dr. Bradley Turner  Complications:  None  Estimated Blood Loss:  None    Permit:  The indications, risks, benefits and alternatives were reviewed with the patient or their decision maker who was provided an opportunity to ask questions and all questions were answered. The specific risks of esophagogastroduodenoscopy with conscious sedation were reviewed, including but not limited to anesthetic complication, bleeding, adverse drug reaction, missed lesion, infection, IV site reactions, and intestinal perforation which would lead to the need for surgical repair. Alternatives to EGD including radiographic imaging, observation without testing, or laboratory testing were reviewed as well as the limitations of those alternatives discussed. After considering the options and having all their questions answered, the patient or their decision maker provided both verbal and written consent to proceed. Procedure in Detail:  After obtaining informed consent, positioning of the patient in the left lateral decubitus position, and conduction of a pre-procedure pause or \"time out\" the endoscope was introduced into the mouth and advanced to the duodenum. A careful inspection was made, and findings or interventions are described below. Findings:   Esophagus:normal  Stomach: normal   Duodenum/jejunum: We discovered 5 AVM of the proximal small intestine and used APC to ablate them succesfully.   Hemostasis confirmed and no perforation. Specimens: none  See above    Impression: AVM with ablation therapy provided           Recommendations:    CBC in 8 weeks          Thank you for entrusting me with this patient's care. Please do not hesitate to contact me with any questions or if I can be of assistance with any of your other patients' GI needs. Signed By: Bradley Turner MD                        January 22, 2021     Surgical assistant none. Implants none unless specified.

## 2021-01-22 NOTE — PERIOP NOTES
[id number]  Anesthesia staff at patient's bedside administering anesthesia and monitoring patients vital signs throughout procedure. See anesthesia note. Post procedure, report received from Chino WHALEN. [id number]  Endoscope was pre-cleaned at bedside immediately following procedure by [employer] Doris gregg. [id number]  Patient tolerated procedure. Abdomen soft and patient arousable and voices no complaints. Patient transported to endoscopy recovery area. Report given to post procedure RNCheryl.

## 2021-01-22 NOTE — PROGRESS NOTES
Endoscopy discharge instructions have been reviewed and given to patient. The patient verbalized understanding and acceptance of instructions. Dr. Tate Sebastian  discussed with patient procedure findings and next steps.

## 2022-03-18 PROBLEM — R07.89 ATYPICAL CHEST PAIN: Status: ACTIVE | Noted: 2019-12-22

## 2022-08-21 ENCOUNTER — APPOINTMENT (OUTPATIENT)
Dept: CT IMAGING | Age: 72
End: 2022-08-21
Attending: STUDENT IN AN ORGANIZED HEALTH CARE EDUCATION/TRAINING PROGRAM
Payer: MEDICARE

## 2022-08-21 ENCOUNTER — HOSPITAL ENCOUNTER (EMERGENCY)
Age: 72
Discharge: HOME | End: 2022-08-21
Attending: STUDENT IN AN ORGANIZED HEALTH CARE EDUCATION/TRAINING PROGRAM
Payer: MEDICARE

## 2022-08-21 VITALS
HEART RATE: 54 BPM | WEIGHT: 95 LBS | HEIGHT: 62 IN | RESPIRATION RATE: 14 BRPM | OXYGEN SATURATION: 98 % | SYSTOLIC BLOOD PRESSURE: 143 MMHG | DIASTOLIC BLOOD PRESSURE: 74 MMHG | TEMPERATURE: 97.7 F | BODY MASS INDEX: 17.48 KG/M2

## 2022-08-21 DIAGNOSIS — R10.13 DYSPEPSIA: Primary | ICD-10-CM

## 2022-08-21 LAB
ABO + RH BLD: NORMAL
ALBUMIN SERPL-MCNC: 3.5 G/DL (ref 3.5–5)
ALBUMIN/GLOB SERPL: 0.9 {RATIO} (ref 1.1–2.2)
ALP SERPL-CCNC: 47 U/L (ref 45–117)
ALT SERPL-CCNC: 23 U/L (ref 12–78)
ANION GAP SERPL CALC-SCNC: 6 MMOL/L (ref 5–15)
AST SERPL-CCNC: 11 U/L (ref 15–37)
BASOPHILS # BLD: 0.1 K/UL (ref 0–0.1)
BASOPHILS NFR BLD: 2 % (ref 0–1)
BILIRUB SERPL-MCNC: 0.5 MG/DL (ref 0.2–1)
BLOOD GROUP ANTIBODIES SERPL: NORMAL
BUN SERPL-MCNC: 22 MG/DL (ref 6–20)
BUN/CREAT SERPL: 30 (ref 12–20)
CALCIUM SERPL-MCNC: 9.2 MG/DL (ref 8.5–10.1)
CHLORIDE SERPL-SCNC: 108 MMOL/L (ref 97–108)
CO2 SERPL-SCNC: 31 MMOL/L (ref 21–32)
COMMENT, HOLDF: NORMAL
CREAT SERPL-MCNC: 0.74 MG/DL (ref 0.55–1.02)
DIFFERENTIAL METHOD BLD: ABNORMAL
EOSINOPHIL # BLD: 0 K/UL (ref 0–0.4)
EOSINOPHIL NFR BLD: 1 % (ref 0–7)
ERYTHROCYTE [DISTWIDTH] IN BLOOD BY AUTOMATED COUNT: 13.3 % (ref 11.5–14.5)
GLOBULIN SER CALC-MCNC: 3.8 G/DL (ref 2–4)
GLUCOSE SERPL-MCNC: 112 MG/DL (ref 65–100)
HCT VFR BLD AUTO: 34.7 % (ref 35–47)
HEMOCCULT STL QL: NEGATIVE
HGB BLD-MCNC: 11 G/DL (ref 11.5–16)
IMM GRANULOCYTES # BLD AUTO: 0 K/UL (ref 0–0.04)
IMM GRANULOCYTES NFR BLD AUTO: 0 % (ref 0–0.5)
LIPASE SERPL-CCNC: 81 U/L (ref 73–393)
LYMPHOCYTES # BLD: 0.8 K/UL (ref 0.8–3.5)
LYMPHOCYTES NFR BLD: 24 % (ref 12–49)
MCH RBC QN AUTO: 28.6 PG (ref 26–34)
MCHC RBC AUTO-ENTMCNC: 31.7 G/DL (ref 30–36.5)
MCV RBC AUTO: 90.4 FL (ref 80–99)
MONOCYTES # BLD: 0.3 K/UL (ref 0–1)
MONOCYTES NFR BLD: 8 % (ref 5–13)
NEUTS SEG # BLD: 2.1 K/UL (ref 1.8–8)
NEUTS SEG NFR BLD: 65 % (ref 32–75)
NRBC # BLD: 0 K/UL (ref 0–0.01)
NRBC BLD-RTO: 0 PER 100 WBC
PLATELET # BLD AUTO: 251 K/UL (ref 150–400)
PMV BLD AUTO: 10.4 FL (ref 8.9–12.9)
POTASSIUM SERPL-SCNC: 3.1 MMOL/L (ref 3.5–5.1)
PROT SERPL-MCNC: 7.3 G/DL (ref 6.4–8.2)
RBC # BLD AUTO: 3.84 M/UL (ref 3.8–5.2)
RBC MORPH BLD: ABNORMAL
SAMPLES BEING HELD,HOLD: NORMAL
SODIUM SERPL-SCNC: 145 MMOL/L (ref 136–145)
SPECIMEN EXP DATE BLD: NORMAL
WBC # BLD AUTO: 3.3 K/UL (ref 3.6–11)

## 2022-08-21 PROCEDURE — 93005 ELECTROCARDIOGRAM TRACING: CPT

## 2022-08-21 PROCEDURE — 83690 ASSAY OF LIPASE: CPT

## 2022-08-21 PROCEDURE — 80053 COMPREHEN METABOLIC PANEL: CPT

## 2022-08-21 PROCEDURE — 74011000636 HC RX REV CODE- 636: Performed by: RADIOLOGY

## 2022-08-21 PROCEDURE — 85025 COMPLETE CBC W/AUTO DIFF WBC: CPT

## 2022-08-21 PROCEDURE — 74177 CT ABD & PELVIS W/CONTRAST: CPT

## 2022-08-21 PROCEDURE — 82272 OCCULT BLD FECES 1-3 TESTS: CPT

## 2022-08-21 PROCEDURE — 36415 COLL VENOUS BLD VENIPUNCTURE: CPT

## 2022-08-21 PROCEDURE — 99285 EMERGENCY DEPT VISIT HI MDM: CPT

## 2022-08-21 PROCEDURE — 86900 BLOOD TYPING SEROLOGIC ABO: CPT

## 2022-08-21 RX ORDER — PANTOPRAZOLE SODIUM 40 MG/1
40 TABLET, DELAYED RELEASE ORAL DAILY
Qty: 20 TABLET | Refills: 0 | Status: SHIPPED | OUTPATIENT
Start: 2022-08-21 | End: 2022-09-10

## 2022-08-21 RX ADMIN — IOPAMIDOL 100 ML: 755 INJECTION, SOLUTION INTRAVENOUS at 11:27

## 2022-08-21 NOTE — ED NOTES
Patient given discharge instructions per provider and verbalized understanding. Patient wheeled to exit from ED with son as  of vehicle.

## 2022-08-21 NOTE — ED PROVIDER NOTES
Patient is a 70-year-old female present emergency department with abdominal pain, chest pain. Patient states that she is having severe indigestion that describes as a burning sensation in the middle of her chest patient also having epigastric abdominal pain and reports black stools. Patient also concerned that she has had severe weight loss unintentional over the last month on arrival today patient weighs 43.1 kg. On chart review patient had EGD performed in  by Dr. Jay Walden where it was found that she had 5 AVMs in the upper small bowel. Patient also states that she has little to no appetite. She denies any fevers, chills she has had nausea without vomiting.        Past Medical History:   Diagnosis Date    Arthritis     Degenerative disc disease     Gastrointestinal disorder     acid reflux    GERD (gastroesophageal reflux disease)        Past Surgical History:   Procedure Laterality Date    COLONOSCOPY N/A 2019    COLONOSCOPY performed by Ha Campos MD at [facility] ENDOSCOPY    HX CHOLECYSTECTOMY      HX GI      cholecystectomy    HX HYSTERECTOMY      HX OTHER SURGICAL      Previous GI bleed         Family History:   Problem Relation Age of Onset    Cancer Father         lung    Diabetes Mother     Cancer Brother         brain mets to lungs       Social History     Socioeconomic History    Marital status: SINGLE     Spouse name: Not on file    Number of children: Not on file    Years of education: Not on file    Highest education level: Not on file   Occupational History    Not on file   Tobacco Use    Smoking status: Former     Types: Cigarettes     Quit date: 1987     Years since quittin.2    Smokeless tobacco: Never   Vaping Use    Vaping Use: Never used   Substance and Sexual Activity    Alcohol use: Not Currently     Alcohol/week: 0.8 standard drinks     Types: 1 Glasses of wine per week    Drug use: No    Sexual activity: Not on file   Other Topics Concern    Not on file Social History Narrative    Not on file     Social Determinants of Health     Financial Resource Strain: Not on file   Food Insecurity: Not on file   Transportation Needs: Not on file   Physical Activity: Not on file   Stress: Not on file   Social Connections: Not on file   Intimate Partner Violence: Not on file   Housing Stability: Not on file         ALLERGIES: Levaquin [levofloxacin]    Review of Systems   Constitutional:  Positive for activity change, appetite change and fatigue. Cardiovascular:  Positive for chest pain. Gastrointestinal:  Positive for abdominal pain and nausea. All other systems reviewed and are negative. Vitals:    08/21/22 0944 08/21/22 0949   BP: 134/83    Pulse: 63    Resp: 16    Temp: 97.7 °F (36.5 °C)    SpO2: 99% 99%   Weight: 43.1 kg (95 lb)    Height: 5' 2\" (1.575 m)             Physical Exam  Vitals and nursing note reviewed. Constitutional:       Appearance: She is cachectic. HENT:      Head: Normocephalic and atraumatic. Eyes:      Extraocular Movements: Extraocular movements intact. Pupils: Pupils are equal, round, and reactive to light. Cardiovascular:      Rate and Rhythm: Normal rate and regular rhythm. Heart sounds: Normal heart sounds. Pulmonary:      Effort: Pulmonary effort is normal.      Breath sounds: Normal breath sounds. Abdominal:      Tenderness: There is abdominal tenderness in the epigastric area. Genitourinary:     Rectum: Guaiac result negative. Comments: Brown stool in the rectal vault. Musculoskeletal:         General: Normal range of motion. Cervical back: Normal range of motion and neck supple. Skin:     General: Skin is warm. Neurological:      General: No focal deficit present. Mental Status: She is alert and oriented to person, place, and time.    Psychiatric:         Mood and Affect: Mood normal.         Behavior: Behavior normal.        MDM  Number of Diagnoses or Management Options  Diagnosis management comments: GERD, PUD, GI bleed. 77-year-old female present emergency department with unintentional weight loss, epigastric abdominal pain and reported a black stools. Concern for GI bleed high on differential.  Will obtain labs, type and screen. Procedures    EKG shows a sinus bradycardia with a rate of 59 no ST or T wave abnormalities to suggest ischemia or infarct. Evidence of LVH normal intervals throughout. 12:01 PM    Labs, CT imaging reassuring.   No evidence of GI bleed will place patient on Protonix daily we will have patient follow-up with Dr. Aimee Dunn with GI.

## 2022-08-21 NOTE — ED TRIAGE NOTES
Patient arrives from home via EMS with complaints of chest tightness that started 4 days ago. Patient took 325 mg of aspirin prior to arrival with no relief. Patient also reports black stools for a few weeks. PMH of acid reflux.

## 2022-08-22 LAB
ATRIAL RATE: 59 BPM
CALCULATED P AXIS, ECG09: 50 DEGREES
CALCULATED R AXIS, ECG10: 41 DEGREES
CALCULATED T AXIS, ECG11: 56 DEGREES
DIAGNOSIS, 93000: NORMAL
P-R INTERVAL, ECG05: 90 MS
Q-T INTERVAL, ECG07: 388 MS
QRS DURATION, ECG06: 76 MS
QTC CALCULATION (BEZET), ECG08: 384 MS
VENTRICULAR RATE, ECG03: 59 BPM

## 2023-04-24 ENCOUNTER — OFFICE VISIT (OUTPATIENT)
Dept: NEUROLOGY | Age: 73
End: 2023-04-24
Payer: MEDICARE

## 2023-04-24 VITALS
SYSTOLIC BLOOD PRESSURE: 118 MMHG | HEART RATE: 65 BPM | HEIGHT: 62 IN | RESPIRATION RATE: 16 BRPM | BODY MASS INDEX: 16.4 KG/M2 | WEIGHT: 89.1 LBS | OXYGEN SATURATION: 98 % | DIASTOLIC BLOOD PRESSURE: 62 MMHG

## 2023-04-24 DIAGNOSIS — R41.0 CONFUSION: Primary | ICD-10-CM

## 2023-04-24 DIAGNOSIS — R41.3 MEMORY DIFFICULTIES: ICD-10-CM

## 2023-04-24 PROCEDURE — G8400 PT W/DXA NO RESULTS DOC: HCPCS | Performed by: PSYCHIATRY & NEUROLOGY

## 2023-04-24 PROCEDURE — 1090F PRES/ABSN URINE INCON ASSESS: CPT | Performed by: PSYCHIATRY & NEUROLOGY

## 2023-04-24 PROCEDURE — G9717 DOC PT DX DEP/BP F/U NT REQ: HCPCS | Performed by: PSYCHIATRY & NEUROLOGY

## 2023-04-24 PROCEDURE — 1101F PT FALLS ASSESS-DOCD LE1/YR: CPT | Performed by: PSYCHIATRY & NEUROLOGY

## 2023-04-24 PROCEDURE — 1123F ACP DISCUSS/DSCN MKR DOCD: CPT | Performed by: PSYCHIATRY & NEUROLOGY

## 2023-04-24 PROCEDURE — G8419 CALC BMI OUT NRM PARAM NOF/U: HCPCS | Performed by: PSYCHIATRY & NEUROLOGY

## 2023-04-24 PROCEDURE — G8427 DOCREV CUR MEDS BY ELIG CLIN: HCPCS | Performed by: PSYCHIATRY & NEUROLOGY

## 2023-04-24 PROCEDURE — 3017F COLORECTAL CA SCREEN DOC REV: CPT | Performed by: PSYCHIATRY & NEUROLOGY

## 2023-04-24 PROCEDURE — 99204 OFFICE O/P NEW MOD 45 MIN: CPT | Performed by: PSYCHIATRY & NEUROLOGY

## 2023-04-24 PROCEDURE — G8536 NO DOC ELDER MAL SCRN: HCPCS | Performed by: PSYCHIATRY & NEUROLOGY

## 2023-04-24 RX ORDER — QUETIAPINE FUMARATE 50 MG/1
TABLET, FILM COATED ORAL
COMMUNITY
Start: 2023-02-28

## 2023-04-24 RX ORDER — LATANOPROST 50 UG/ML
SOLUTION/ DROPS OPHTHALMIC
COMMUNITY

## 2023-04-24 RX ORDER — DONEPEZIL HYDROCHLORIDE 5 MG/1
5 TABLET, FILM COATED ORAL DAILY
Qty: 30 TABLET | Refills: 0 | Status: SHIPPED | OUTPATIENT
Start: 2023-04-24 | End: 2023-05-24

## 2023-04-24 RX ORDER — DONEPEZIL HYDROCHLORIDE 10 MG/1
10 TABLET, FILM COATED ORAL DAILY
Qty: 90 TABLET | Refills: 3 | Status: SHIPPED | OUTPATIENT
Start: 2023-04-24

## 2023-04-24 NOTE — PROGRESS NOTES
Chief Complaint   Patient presents with    New Patient     Memory concerns: Patient's son stated that it has been going on for a while. Forgetting days.    Referred by PCP     Visit Vitals  /62 (BP 1 Location: Left upper arm, BP Patient Position: Sitting)   Pulse 65   Resp 16   Ht 5' 2\" (1.575 m)   Wt 40.4 kg (89 lb 1.6 oz)   SpO2 98%   BMI 16.30 kg/m²

## 2023-04-24 NOTE — PROGRESS NOTES
[facility] and [address] at [facility] at [address] [facility] [address] [place], [address] [facility] [address] [facility], [address] [place] [address]  [phone] Office  [phone] Facsimile           Referring: Remigio Malik MD  [address],  [address] [facility] [address]     Chief Complaint   Patient presents with    New Patient     Memory concerns: Patient's son stated that it has been going on for a while. Forgetting days. Referred by PCP     76-year-old lady presents today for initial neurologic consultation accompanied by her son for progressive memory difficulty. A bit agitated and tells me there is nothing wrong with memory. Her son notes that she has had over at least the last year progressive cognitive difficulty. She forgets appointments. Will tell her about family events and she claims she was never told about it. She has not driven in over a year. Her son notes that she gets agitated. She was recommended to take Seroquel but she has not been taking it properly. She wandered off. She forgets conversations. She has not been driving for about a year. She will come out of the house. He tries to get her to go for a walk but she will go. He gets her groceries and brings him to the house.   Her mother had dementia starting in her 76s    Record review finds laboratory analysis from August 2022  CBC unremarkable  Metabolic panel with potassium 3.1 otherwise unremarkable  Lipase normal  Past Medical History:   Diagnosis Date    Arthritis     Degenerative disc disease     Gastrointestinal disorder     acid reflux    GERD (gastroesophageal reflux disease)        Past Surgical History:   Procedure Laterality Date    COLONOSCOPY N/A 11/26/2019    COLONOSCOPY performed by Cele Glass MD at [address]  2011    HX GI      cholecystectomy    HX HYSTERECTOMY      HX OTHER SURGICAL      Previous GI bleed       Current Outpatient Medications   Medication Sig Dispense Refill    QUEtiapine (SEROquel) 50 mg tablet 1 tablet at 6 pm Orally Once a day for 30 day(s)      latanoprost (XALATAN) 0.005 % ophthalmic solution INT 1 GTT IN OU QD HS Ophthalmic for 30      ferrous sulfate 325 mg (65 mg iron) tablet TK 1 T PO BID      esomeprazole (NEXIUM) 40 mg capsule Take 1 Capsule by mouth daily. polyethylene glycol 3350 (MIRALAX PO) Take  by mouth.      guaifenesin SR (MUCINEX) 600 mg SR tablet Take 1 Tab by mouth two (2) times daily as needed for Congestion. 30 Tab 0    BISACODYL (DULCOLAX PO) Take 1 Tab by mouth two (2) times a day. loratadine (CLARITIN) 10 mg tablet Take 1 Tablet by mouth daily. MULTIVITAMIN PO Take 1 Tab by mouth. CALCIUM CARBONATE/VITAMIN D3 (CALCIUM 600 + D,3, PO) Take 1 Tab by mouth two (2) times a day. potassium chloride (K-DUR, KLOR-CON) 10 mEq tablet Take 1 Tab by mouth two (2) times a day. (Patient not taking: Reported on 2020) 20 Tab 0    lactulose (CHRONULAC) 10 gram/15 mL solution Take 15 mL by mouth every eight (8) hours as needed for Other (constipation). (Patient not taking: Reported on 2020) 480 mL 0        Allergies   Allergen Reactions    Aspirin Unknown (comments)    Hydroxyzine Compound Unknown (comments)    Levaquin [Levofloxacin] Hives       Social History     Tobacco Use    Smoking status: Former     Types: Cigarettes     Quit date: 1987     Years since quittin.8    Smokeless tobacco: Never   Vaping Use    Vaping Use: Never used   Substance Use Topics    Alcohol use: Not Currently     Alcohol/week: 0.8 standard drinks     Types: 1 Glasses of wine per week    Drug use: No       Family History   Problem Relation Age of Onset    Cancer Father         lung    Diabetes Mother     Cancer Brother         brain mets to lungs       Review of Systems  Pertinent positives and negatives as noted.     Examination  Visit Vitals  /62 (BP 1 Location: Left upper arm, BP Patient Position: Sitting)   Pulse 65   Resp 16   Ht 5' 2\" (1.575 m)   Wt 40.4 kg (89 lb 1.6 oz)   SpO2 98%   BMI 16.30 kg/m²     She is quite agitated today. She does not know the date. She does not know the month. She says its Monday 2023. She does not know the floor. She says quarter dime and a hi or $0.11. She spells the word house forward and backward. She does not know the president. Registration 3/3 recall 0/3 when I give her clues 1/3. Cranial nerves are intact. Motor is full with no deficit. Reflexes symmetrical.  No ataxia    Impression/Plan  64-year-old lady with progressive memory difficulty/confusion and differential diagnosis certainly includes age-related cognitive decline versus mild cognitive impairment versus early dementing process versus processing issue versus attention versus emotional versus metabolic, structural versus vascular versus other  CT of the head  EEG  Carotid Doppler  B12  Thyroid function  RPR  Formal neurocognitive eval  Given the time it takes to get neurocognitive evaluations due to overwhelming need in the community and long wait times we will institute Aricept in a customary fashion and we discussed rationale  I recommended that she take the Seroquel as advised by Dr. Connie Oliveira  Follow-up after testing    Jennifer Estrada MD          This note was created using voice recognition software. Despite editing, there may be syntax errors.

## 2023-04-25 LAB
RPR SER QL: NON REACTIVE
T4 FREE SERPL-MCNC: 1.27 NG/DL (ref 0.82–1.77)
TSH SERPL DL<=0.005 MIU/L-ACNC: 0.9 UIU/ML (ref 0.45–4.5)
VIT B12 SERPL-MCNC: 1035 PG/ML (ref 232–1245)

## 2023-05-10 ENCOUNTER — HOSPITAL ENCOUNTER (OUTPATIENT)
Facility: HOSPITAL | Age: 73
Discharge: HOME | End: 2023-05-13
Payer: MEDICARE

## 2023-05-10 DIAGNOSIS — R41.3 MEMORY DIFFICULTIES: ICD-10-CM

## 2023-05-10 DIAGNOSIS — R41.0 CONFUSION: ICD-10-CM

## 2023-05-10 PROCEDURE — 70450 CT HEAD/BRAIN W/O DYE: CPT

## 2023-05-13 RX ORDER — QUETIAPINE FUMARATE 50 MG/1
TABLET, FILM COATED ORAL
COMMUNITY
Start: 2023-02-28

## 2023-05-13 RX ORDER — DONEPEZIL HYDROCHLORIDE 10 MG/1
10 TABLET, FILM COATED ORAL DAILY
COMMUNITY
Start: 2023-04-24

## 2023-05-13 RX ORDER — LATANOPROST 50 UG/ML
SOLUTION/ DROPS OPHTHALMIC
COMMUNITY

## 2023-05-13 RX ORDER — DONEPEZIL HYDROCHLORIDE 5 MG/1
5 TABLET, FILM COATED ORAL DAILY
Qty: 30 TABLET | Refills: 0 | COMMUNITY
Start: 2023-04-24 | End: 2023-05-24

## 2023-09-14 ENCOUNTER — TELEPHONE (OUTPATIENT)
Age: 73
End: 2023-09-14

## 2023-11-02 ENCOUNTER — HOSPITAL ENCOUNTER (INPATIENT)
Facility: HOSPITAL | Age: 73
LOS: 12 days | Discharge: NURSING, REHAB OR LONG-TERM CARE | DRG: 884 | End: 2023-11-14
Attending: STUDENT IN AN ORGANIZED HEALTH CARE EDUCATION/TRAINING PROGRAM | Admitting: INTERNAL MEDICINE
Payer: MEDICARE

## 2023-11-02 ENCOUNTER — APPOINTMENT (OUTPATIENT)
Facility: HOSPITAL | Age: 73
DRG: 884 | End: 2023-11-02
Payer: MEDICARE

## 2023-11-02 DIAGNOSIS — E86.0 DEHYDRATION: ICD-10-CM

## 2023-11-02 DIAGNOSIS — R62.7 FAILURE TO THRIVE IN ADULT: Primary | ICD-10-CM

## 2023-11-02 DIAGNOSIS — E87.0 HYPERNATREMIA: ICD-10-CM

## 2023-11-02 DIAGNOSIS — E87.20 LACTIC ACIDOSIS: ICD-10-CM

## 2023-11-02 LAB
ALBUMIN SERPL-MCNC: 4.3 G/DL (ref 3.5–5)
ALBUMIN/GLOB SERPL: 1 (ref 1.1–2.2)
ALP SERPL-CCNC: 50 U/L (ref 45–117)
ALT SERPL-CCNC: 20 U/L (ref 12–78)
AMMONIA PLAS-SCNC: <10 UMOL/L
ANION GAP SERPL CALC-SCNC: 8 MMOL/L (ref 5–15)
AST SERPL-CCNC: 11 U/L (ref 15–37)
BASOPHILS # BLD: 0 K/UL (ref 0–0.1)
BASOPHILS NFR BLD: 0 % (ref 0–1)
BILIRUB SERPL-MCNC: 0.4 MG/DL (ref 0.2–1)
BUN SERPL-MCNC: 40 MG/DL (ref 6–20)
BUN/CREAT SERPL: 45 (ref 12–20)
CALCIUM SERPL-MCNC: 9.9 MG/DL (ref 8.5–10.1)
CHLORIDE SERPL-SCNC: 118 MMOL/L (ref 97–108)
CK SERPL-CCNC: 131 U/L (ref 26–192)
CO2 SERPL-SCNC: 27 MMOL/L (ref 21–32)
COMMENT:: NORMAL
CREAT SERPL-MCNC: 0.88 MG/DL (ref 0.55–1.02)
DIFFERENTIAL METHOD BLD: ABNORMAL
EKG ATRIAL RATE: 85 BPM
EKG DIAGNOSIS: NORMAL
EKG P AXIS: 58 DEGREES
EKG P-R INTERVAL: 102 MS
EKG Q-T INTERVAL: 340 MS
EKG QRS DURATION: 74 MS
EKG QTC CALCULATION (BAZETT): 404 MS
EKG R AXIS: 68 DEGREES
EKG T AXIS: -51 DEGREES
EKG VENTRICULAR RATE: 85 BPM
EOSINOPHIL # BLD: 0 K/UL (ref 0–0.4)
EOSINOPHIL NFR BLD: 0 % (ref 0–7)
ERYTHROCYTE [DISTWIDTH] IN BLOOD BY AUTOMATED COUNT: 16.9 % (ref 11.5–14.5)
GLOBULIN SER CALC-MCNC: 4.4 G/DL (ref 2–4)
GLUCOSE BLD STRIP.AUTO-MCNC: 107 MG/DL (ref 65–117)
GLUCOSE SERPL-MCNC: 129 MG/DL (ref 65–100)
HCT VFR BLD AUTO: 44.6 % (ref 35–47)
HGB BLD-MCNC: 13 G/DL (ref 11.5–16)
IMM GRANULOCYTES # BLD AUTO: 0 K/UL (ref 0–0.04)
IMM GRANULOCYTES NFR BLD AUTO: 1 % (ref 0–0.5)
LACTATE BLD-SCNC: 2.15 MMOL/L (ref 0.4–2)
LACTATE BLD-SCNC: 2.77 MMOL/L (ref 0.4–2)
LACTATE BLD-SCNC: 4.37 MMOL/L (ref 0.4–2)
LIPASE SERPL-CCNC: 83 U/L (ref 13–75)
LYMPHOCYTES # BLD: 0.8 K/UL (ref 0.8–3.5)
LYMPHOCYTES NFR BLD: 12 % (ref 12–49)
MAGNESIUM SERPL-MCNC: 2.6 MG/DL (ref 1.6–2.4)
MCH RBC QN AUTO: 25 PG (ref 26–34)
MCHC RBC AUTO-ENTMCNC: 29.1 G/DL (ref 30–36.5)
MCV RBC AUTO: 85.6 FL (ref 80–99)
MONOCYTES # BLD: 0.4 K/UL (ref 0–1)
MONOCYTES NFR BLD: 6 % (ref 5–13)
NEUTS SEG # BLD: 5.8 K/UL (ref 1.8–8)
NEUTS SEG NFR BLD: 81 % (ref 32–75)
NRBC # BLD: 0 K/UL (ref 0–0.01)
NRBC BLD-RTO: 0 PER 100 WBC
PHOSPHATE SERPL-MCNC: 3.7 MG/DL (ref 2.6–4.7)
PLATELET # BLD AUTO: 392 K/UL (ref 150–400)
PMV BLD AUTO: 11.2 FL (ref 8.9–12.9)
POTASSIUM SERPL-SCNC: 4.1 MMOL/L (ref 3.5–5.1)
PROCALCITONIN SERPL-MCNC: <0.05 NG/ML
PROT SERPL-MCNC: 8.7 G/DL (ref 6.4–8.2)
RBC # BLD AUTO: 5.21 M/UL (ref 3.8–5.2)
SERVICE CMNT-IMP: NORMAL
SODIUM SERPL-SCNC: 153 MMOL/L (ref 136–145)
SPECIMEN HOLD: NORMAL
TROPONIN I SERPL HS-MCNC: 10 NG/L (ref 0–51)
TSH SERPL DL<=0.05 MIU/L-ACNC: 0.48 UIU/ML (ref 0.36–3.74)
WBC # BLD AUTO: 7.1 K/UL (ref 3.6–11)

## 2023-11-02 PROCEDURE — 84100 ASSAY OF PHOSPHORUS: CPT

## 2023-11-02 PROCEDURE — 83690 ASSAY OF LIPASE: CPT

## 2023-11-02 PROCEDURE — 93010 ELECTROCARDIOGRAM REPORT: CPT | Performed by: SPECIALIST

## 2023-11-02 PROCEDURE — 93005 ELECTROCARDIOGRAM TRACING: CPT | Performed by: STUDENT IN AN ORGANIZED HEALTH CARE EDUCATION/TRAINING PROGRAM

## 2023-11-02 PROCEDURE — 70450 CT HEAD/BRAIN W/O DYE: CPT

## 2023-11-02 PROCEDURE — 84484 ASSAY OF TROPONIN QUANT: CPT

## 2023-11-02 PROCEDURE — 84145 PROCALCITONIN (PCT): CPT

## 2023-11-02 PROCEDURE — 83605 ASSAY OF LACTIC ACID: CPT

## 2023-11-02 PROCEDURE — 82550 ASSAY OF CK (CPK): CPT

## 2023-11-02 PROCEDURE — 96361 HYDRATE IV INFUSION ADD-ON: CPT

## 2023-11-02 PROCEDURE — 1100000000 HC RM PRIVATE

## 2023-11-02 PROCEDURE — 85025 COMPLETE CBC W/AUTO DIFF WBC: CPT

## 2023-11-02 PROCEDURE — 84443 ASSAY THYROID STIM HORMONE: CPT

## 2023-11-02 PROCEDURE — 2580000003 HC RX 258: Performed by: INTERNAL MEDICINE

## 2023-11-02 PROCEDURE — 82962 GLUCOSE BLOOD TEST: CPT

## 2023-11-02 PROCEDURE — 82607 VITAMIN B-12: CPT

## 2023-11-02 PROCEDURE — 71045 X-RAY EXAM CHEST 1 VIEW: CPT

## 2023-11-02 PROCEDURE — 96360 HYDRATION IV INFUSION INIT: CPT

## 2023-11-02 PROCEDURE — 87040 BLOOD CULTURE FOR BACTERIA: CPT

## 2023-11-02 PROCEDURE — 80053 COMPREHEN METABOLIC PANEL: CPT

## 2023-11-02 PROCEDURE — 2580000003 HC RX 258: Performed by: STUDENT IN AN ORGANIZED HEALTH CARE EDUCATION/TRAINING PROGRAM

## 2023-11-02 PROCEDURE — 83735 ASSAY OF MAGNESIUM: CPT

## 2023-11-02 PROCEDURE — 82140 ASSAY OF AMMONIA: CPT

## 2023-11-02 PROCEDURE — 6370000000 HC RX 637 (ALT 250 FOR IP): Performed by: INTERNAL MEDICINE

## 2023-11-02 PROCEDURE — 99285 EMERGENCY DEPT VISIT HI MDM: CPT

## 2023-11-02 PROCEDURE — 36415 COLL VENOUS BLD VENIPUNCTURE: CPT

## 2023-11-02 PROCEDURE — 6360000002 HC RX W HCPCS: Performed by: INTERNAL MEDICINE

## 2023-11-02 RX ORDER — DEXTROSE AND SODIUM CHLORIDE 5; .45 G/100ML; G/100ML
INJECTION, SOLUTION INTRAVENOUS CONTINUOUS
Status: DISCONTINUED | OUTPATIENT
Start: 2023-11-02 | End: 2023-11-03

## 2023-11-02 RX ORDER — SODIUM CHLORIDE 0.9 % (FLUSH) 0.9 %
5-40 SYRINGE (ML) INJECTION PRN
Status: DISCONTINUED | OUTPATIENT
Start: 2023-11-02 | End: 2023-11-14 | Stop reason: HOSPADM

## 2023-11-02 RX ORDER — ACETAMINOPHEN 650 MG/1
650 SUPPOSITORY RECTAL EVERY 6 HOURS PRN
Status: DISCONTINUED | OUTPATIENT
Start: 2023-11-02 | End: 2023-11-14 | Stop reason: HOSPADM

## 2023-11-02 RX ORDER — ONDANSETRON 2 MG/ML
4 INJECTION INTRAMUSCULAR; INTRAVENOUS EVERY 6 HOURS PRN
Status: DISCONTINUED | OUTPATIENT
Start: 2023-11-02 | End: 2023-11-14 | Stop reason: HOSPADM

## 2023-11-02 RX ORDER — HALOPERIDOL 5 MG/ML
0.5 INJECTION INTRAMUSCULAR EVERY 6 HOURS PRN
Status: DISCONTINUED | OUTPATIENT
Start: 2023-11-02 | End: 2023-11-03

## 2023-11-02 RX ORDER — MAGNESIUM SULFATE IN WATER 40 MG/ML
2000 INJECTION, SOLUTION INTRAVENOUS PRN
Status: DISCONTINUED | OUTPATIENT
Start: 2023-11-02 | End: 2023-11-03

## 2023-11-02 RX ORDER — POTASSIUM CHLORIDE 750 MG/1
40 TABLET, FILM COATED, EXTENDED RELEASE ORAL PRN
Status: DISCONTINUED | OUTPATIENT
Start: 2023-11-02 | End: 2023-11-03

## 2023-11-02 RX ORDER — ACETAMINOPHEN 325 MG/1
650 TABLET ORAL EVERY 6 HOURS PRN
Status: DISCONTINUED | OUTPATIENT
Start: 2023-11-02 | End: 2023-11-14 | Stop reason: HOSPADM

## 2023-11-02 RX ORDER — SODIUM CHLORIDE 0.9 % (FLUSH) 0.9 %
5-40 SYRINGE (ML) INJECTION EVERY 12 HOURS SCHEDULED
Status: DISCONTINUED | OUTPATIENT
Start: 2023-11-02 | End: 2023-11-14 | Stop reason: HOSPADM

## 2023-11-02 RX ORDER — HEPARIN SODIUM 5000 [USP'U]/ML
5000 INJECTION, SOLUTION INTRAVENOUS; SUBCUTANEOUS EVERY 12 HOURS
Status: DISCONTINUED | OUTPATIENT
Start: 2023-11-02 | End: 2023-11-03

## 2023-11-02 RX ORDER — POLYETHYLENE GLYCOL 3350 17 G/17G
17 POWDER, FOR SOLUTION ORAL DAILY PRN
Status: DISCONTINUED | OUTPATIENT
Start: 2023-11-02 | End: 2023-11-03

## 2023-11-02 RX ORDER — ENOXAPARIN SODIUM 100 MG/ML
30 INJECTION SUBCUTANEOUS DAILY
Status: DISCONTINUED | OUTPATIENT
Start: 2023-11-02 | End: 2023-11-02

## 2023-11-02 RX ORDER — POTASSIUM CHLORIDE 7.45 MG/ML
10 INJECTION INTRAVENOUS PRN
Status: DISCONTINUED | OUTPATIENT
Start: 2023-11-02 | End: 2023-11-03

## 2023-11-02 RX ORDER — ONDANSETRON 4 MG/1
4 TABLET, ORALLY DISINTEGRATING ORAL EVERY 8 HOURS PRN
Status: DISCONTINUED | OUTPATIENT
Start: 2023-11-02 | End: 2023-11-03

## 2023-11-02 RX ORDER — PETROLATUM,WHITE
OINTMENT IN PACKET (GRAM) TOPICAL 3 TIMES DAILY
Status: DISCONTINUED | OUTPATIENT
Start: 2023-11-02 | End: 2023-11-14 | Stop reason: HOSPADM

## 2023-11-02 RX ORDER — 0.9 % SODIUM CHLORIDE 0.9 %
1000 INTRAVENOUS SOLUTION INTRAVENOUS ONCE
Status: COMPLETED | OUTPATIENT
Start: 2023-11-02 | End: 2023-11-02

## 2023-11-02 RX ORDER — SODIUM CHLORIDE, SODIUM LACTATE, POTASSIUM CHLORIDE, AND CALCIUM CHLORIDE .6; .31; .03; .02 G/100ML; G/100ML; G/100ML; G/100ML
500 INJECTION, SOLUTION INTRAVENOUS ONCE
Status: COMPLETED | OUTPATIENT
Start: 2023-11-02 | End: 2023-11-02

## 2023-11-02 RX ORDER — THIAMINE HYDROCHLORIDE 100 MG/ML
100 INJECTION, SOLUTION INTRAMUSCULAR; INTRAVENOUS EVERY 24 HOURS
Status: DISCONTINUED | OUTPATIENT
Start: 2023-11-02 | End: 2023-11-03

## 2023-11-02 RX ORDER — SODIUM CHLORIDE 9 MG/ML
INJECTION, SOLUTION INTRAVENOUS PRN
Status: DISCONTINUED | OUTPATIENT
Start: 2023-11-02 | End: 2023-11-14 | Stop reason: HOSPADM

## 2023-11-02 RX ADMIN — SODIUM CHLORIDE 1000 ML: 9 INJECTION, SOLUTION INTRAVENOUS at 20:25

## 2023-11-02 RX ADMIN — Medication: at 22:07

## 2023-11-02 RX ADMIN — SODIUM CHLORIDE, POTASSIUM CHLORIDE, SODIUM LACTATE AND CALCIUM CHLORIDE 500 ML: 600; 310; 30; 20 INJECTION, SOLUTION INTRAVENOUS at 17:03

## 2023-11-02 RX ADMIN — SODIUM CHLORIDE, PRESERVATIVE FREE 10 ML: 5 INJECTION INTRAVENOUS at 20:25

## 2023-11-02 RX ADMIN — DEXTROSE AND SODIUM CHLORIDE: 5; 450 INJECTION, SOLUTION INTRAVENOUS at 22:01

## 2023-11-02 RX ADMIN — HEPARIN SODIUM 5000 UNITS: 5000 INJECTION INTRAVENOUS; SUBCUTANEOUS at 21:23

## 2023-11-02 ASSESSMENT — PAIN - FUNCTIONAL ASSESSMENT: PAIN_FUNCTIONAL_ASSESSMENT: NONE - DENIES PAIN

## 2023-11-02 NOTE — ED PROVIDER NOTES
EMERGENCY DEPARTMENT PHYSICIAN NOTE     Patient: Diana Underwood     Time of Service: 2023  3:21 PM     Chief complaint:   Chief Complaint   Patient presents with    Failure To Thrive    Fatigue        HISTORY:  Patient is a 74 y.o. female who presents to the emergency department with complaints by family of failure to thrive. Per family she usually gets up and about but over the last week she has not gotten out of bed. Per family she is progressively worsened over the last year but acutely worse over the last week. Outside any window for stroke activation. Patient has dry lips and is oriented only to self. Per family she has poor p.o. intake. I suspect she is not eating or drinking much and family agrees with this. Family suspects possible dementia. Patient's son at bedside. Patient's vital signs are stable but she is ill-appearing and cachectic. Past Medical History:   Diagnosis Date    Arthritis     Degenerative disc disease     Gastrointestinal disorder     acid reflux    GERD (gastroesophageal reflux disease)         Past Surgical History:   Procedure Laterality Date    CHOLECYSTECTOMY      COLONOSCOPY N/A 2019    COLONOSCOPY performed by Yanet Varner MD at [address]      cholecystectomy    HYSTERECTOMY (CERVIX STATUS UNKNOWN)      OTHER SURGICAL HISTORY      Previous GI bleed        Family History   Problem Relation Age of Onset    Cancer Brother         brain mets to lungs    Cancer Father         lung    Diabetes Mother         Social History     Socioeconomic History    Marital status: Single   Tobacco Use    Smoking status: Former     Types: Cigarettes     Quit date: 1987     Years since quittin.4    Smokeless tobacco: Never   Substance and Sexual Activity    Alcohol use: Not Currently     Alcohol/week: 0.8 standard drinks of alcohol    Drug use: No        Current Medications: Reviewed in chart. Allergies:    Allergies   Allergen Reactions

## 2023-11-02 NOTE — ACP (ADVANCE CARE PLANNING)
Advanced care planning    Met with son at bedside. Patient is disoriented and unable to answer questions due to advanced dementia. Reviewed overall clinical condition, prognosis, and goals of care. At this point in time, the patient's son would like to try hydration and conservative medical management to determine if the patient will respond to these measures. I advised him this is likely to be a recurrent problem due to her progressive underlying disease and risk of recurrence. He is willing to entertain a palliative care consult and would consider transitioning to comfort care if she does not respond to treatment with IV fluids and conservative management. I also specifically addressed CODE STATUS. Patient son wants her to be a DNR. He he would not want heroic efforts or life-sustaining treatment in the setting of cardiopulmonary arrest including CPR, intubation, mechanical ventilation.     CODE STATUS: DNR      Time spent: 20 minutes

## 2023-11-02 NOTE — H&P
Hospitalist Admission Note    NAME: Logan Steen   :  1950   MRN:  [id number]     Date/Time:  2023 7:42 PM    Patient PCP: Ondina Richard MD      CHIEF COMPLAINT:   Failure to thrive    HISTORY OF PRESENT ILLNESS:     Logan Steen is a 74 y.o.  female with history of dementia, progressive debility, arthritis, constipation, and GERD presenting with acute worsening of failure to thrive. The patient is unable to provide history due to advanced dementia. She is accompanied by her son who is also her caretaker. The patient lives alone but her son buys her groceries and checks on her frequently. Over the past few weeks she has stopped taking her medications and has stopped eating and drinking for at least the last several days. She does not get out of bed. Last spring, the patient son took her to neurology for evaluation due to concern for memory impairment and progressive decline. Dr. Deedee Nicholson note from April mentions the possibility of early dementia versus cognitive decline. The work-up at that time included CT of the head, EEG, carotid Doppler, B12, thyroid function, RPR, and formal neurocognitive evaluation was recommended. Since then, the patient has become more and more disabled and dependent on her family. In the ER today, she was severely dehydrated on exam and was found to have lactic acidosis and hypernatremia. CT of the head showed no acute process. Urinalysis was ordered but has not yet been conducted. She is being admitted to the hospitalist service for further evaluation and treatment. Goals of care were reviewed with the patient's son and documented separately. See ACP note.       PAST MEDICAL HISTORY:  Progressive memory impairment, suspected dementia  Progressive debility  GERD  Osteoarthritis  Constipation  Glaucoma    Past Medical History:   Diagnosis Date    Arthritis     Degenerative disc disease     Gastrointestinal disorder     acid reflux    GERD (gastroesophageal reflux

## 2023-11-03 PROBLEM — R53.1 GENERALIZED WEAKNESS: Status: ACTIVE | Noted: 2023-11-03

## 2023-11-03 PROBLEM — Z71.89 COUNSELING REGARDING ADVANCE CARE PLANNING AND GOALS OF CARE: Status: ACTIVE | Noted: 2023-11-03

## 2023-11-03 PROBLEM — R45.1 RESTLESSNESS AND AGITATION: Status: ACTIVE | Noted: 2023-11-03

## 2023-11-03 PROBLEM — Z51.5 PALLIATIVE CARE BY SPECIALIST: Status: ACTIVE | Noted: 2023-11-03

## 2023-11-03 PROBLEM — R53.81 PHYSICAL DEBILITY: Status: ACTIVE | Noted: 2023-11-03

## 2023-11-03 PROBLEM — R63.8 INADEQUATE ORAL INTAKE: Status: ACTIVE | Noted: 2023-11-03

## 2023-11-03 PROBLEM — R41.89 IMPAIRED COGNITIVE ABILITY: Status: ACTIVE | Noted: 2023-11-03

## 2023-11-03 PROBLEM — Z71.89 DNR (DO NOT RESUSCITATE) DISCUSSION: Status: ACTIVE | Noted: 2023-11-03

## 2023-11-03 LAB — VIT B12 SERPL-MCNC: >2000 PG/ML (ref 193–986)

## 2023-11-03 PROCEDURE — 6360000002 HC RX W HCPCS: Performed by: INTERNAL MEDICINE

## 2023-11-03 PROCEDURE — 51798 US URINE CAPACITY MEASURE: CPT

## 2023-11-03 PROCEDURE — 99222 1ST HOSP IP/OBS MODERATE 55: CPT | Performed by: FAMILY MEDICINE

## 2023-11-03 PROCEDURE — 6360000002 HC RX W HCPCS

## 2023-11-03 PROCEDURE — 2580000003 HC RX 258

## 2023-11-03 PROCEDURE — 99497 ADVNCD CARE PLAN 30 MIN: CPT | Performed by: FAMILY MEDICINE

## 2023-11-03 PROCEDURE — 1100000003 HC PRIVATE W/ TELEMETRY

## 2023-11-03 PROCEDURE — 94761 N-INVAS EAR/PLS OXIMETRY MLT: CPT

## 2023-11-03 RX ORDER — FERROUS SULFATE 325(65) MG
325 TABLET ORAL 2 TIMES DAILY
Status: DISCONTINUED | OUTPATIENT
Start: 2023-11-03 | End: 2023-11-03

## 2023-11-03 RX ORDER — DONEPEZIL HYDROCHLORIDE 5 MG/1
10 TABLET, FILM COATED ORAL DAILY
Status: DISCONTINUED | OUTPATIENT
Start: 2023-11-03 | End: 2023-11-03

## 2023-11-03 RX ORDER — LACTULOSE 10 G/15ML
10 SOLUTION ORAL 2 TIMES DAILY PRN
Status: DISCONTINUED | OUTPATIENT
Start: 2023-11-03 | End: 2023-11-03

## 2023-11-03 RX ORDER — PANTOPRAZOLE SODIUM 40 MG/1
40 TABLET, DELAYED RELEASE ORAL
Status: DISCONTINUED | OUTPATIENT
Start: 2023-11-03 | End: 2023-11-03

## 2023-11-03 RX ORDER — HALOPERIDOL 5 MG/ML
1 INJECTION INTRAMUSCULAR
Status: DISCONTINUED | OUTPATIENT
Start: 2023-11-03 | End: 2023-11-14 | Stop reason: HOSPADM

## 2023-11-03 RX ORDER — MORPHINE SULFATE 2 MG/ML
2 INJECTION, SOLUTION INTRAMUSCULAR; INTRAVENOUS EVERY 30 MIN PRN
Status: DISCONTINUED | OUTPATIENT
Start: 2023-11-03 | End: 2023-11-14 | Stop reason: HOSPADM

## 2023-11-03 RX ORDER — LORAZEPAM 2 MG/ML
0.5 INJECTION INTRAMUSCULAR EVERY 30 MIN PRN
Status: DISCONTINUED | OUTPATIENT
Start: 2023-11-03 | End: 2023-11-07

## 2023-11-03 RX ORDER — QUETIAPINE FUMARATE 25 MG/1
50 TABLET, FILM COATED ORAL NIGHTLY
Status: DISCONTINUED | OUTPATIENT
Start: 2023-11-03 | End: 2023-11-05

## 2023-11-03 RX ORDER — 0.9 % SODIUM CHLORIDE 0.9 %
500 INTRAVENOUS SOLUTION INTRAVENOUS ONCE
Status: COMPLETED | OUTPATIENT
Start: 2023-11-03 | End: 2023-11-03

## 2023-11-03 RX ORDER — LATANOPROST 50 UG/ML
1 SOLUTION/ DROPS OPHTHALMIC NIGHTLY
Status: DISCONTINUED | OUTPATIENT
Start: 2023-11-03 | End: 2023-11-04

## 2023-11-03 RX ADMIN — SODIUM CHLORIDE 500 ML: 9 INJECTION, SOLUTION INTRAVENOUS at 00:06

## 2023-11-03 RX ADMIN — THIAMINE HYDROCHLORIDE 100 MG: 100 INJECTION, SOLUTION INTRAMUSCULAR; INTRAVENOUS at 00:05

## 2023-11-03 RX ADMIN — PHENYLEPHRINE HYDROCHLORIDE 30 MCG/MIN: 10 INJECTION INTRAVENOUS at 01:47

## 2023-11-03 ASSESSMENT — PAIN SCALES - WONG BAKER: WONGBAKER_NUMERICALRESPONSE: 0

## 2023-11-03 NOTE — ACP (ADVANCE CARE PLANNING)
Advance Care Planning     Advance Care Planning (ACP) Conversation    Date of Conversation: 11/3/23  Conducted with: patient's son Trey Yi MD and SW    Conversation:  Patient does not have an AMD. She has two children who are her NOK. We called patient's son Rosy Hernandez this afternoon. We discussed patient's current medical condition. Rosy Hernandez discusses that his mom's health has been declining progressively over the last year. She hasn't been eating and drinking much for a week or so, and her intake had been declining over time prior to that despite encouragement from family to eat. Rosy Hernandez says that consideration of hospice was recommended. Hospice philosophy discussed. Rosy Hernandez is agreeable to hospice and says that is is not surprising that she needs hospice. States he has seen that this was eventually coming for a while. He multiple times mentions that his mom is in the \"final stages\" of dementia and that she is in a \"transition. \" He recognizes that if his mom does not eat or drink that her prognosis is limited and may possibly be in the range of days to a week or so. Discussed that patient cannot live alone anymore. Son is unable to provide 24/7 care at this time. Son states patient will need facility placement in order to receive 24/7 care. I will request  assistance with this. We will also consult hospice. I asked son if he would like to initiate comfort measures only now, which is essentially starting hospice-like care without formally entering into hospice yet. This transition would mean no labs and only use of medications that will promote comfort if patient will allow them to be administered. He is agreeable to comfort measures only now prior to formal hospice admission. Conversation Outcomes / Follow-Up Plan:  -Patient has two children, and Rosy Hernandez is the only reachable child at this time.  I attempted to communicate with daughter Alanis Iniguez but received voicemail--voicemail and office number left

## 2023-11-03 NOTE — CONSULTS
Palliative Medicine  Patient Name: Jacky Hampton  YOB: 1950  MRN: [id number]  Age: 74 y.o. Gender: female    Date of Initial Consult: 11/3/2023  Date of Service: 11/3/2023  Time: 4:36 PM  Provider: Di Wan MD  Hospital Day: 2  Admit Date: 11/2/2023  Referring Provider: Dr. Valerie Guillory and Dr. Romy Felton      Reasons for Consultation:  Goals of Care    HISTORY OF PRESENT ILLNESS (HPI):   Jacky Hampton is a 74 y.o. female with cognitive impairment suspected to be d/t dementia, GERD, glaucoma, osteoarthritis, constipation who was admitted on 11/2/2023 from home with a diagnosis of dehydration, hypernatremia, lactic acidosis. She has been receiving IVF and required pippa overnight. Overall picture suspected to be related to failure to thrive d/t advanced dementia. Patient has been refusing food, medications and physical care. Overall prognosis is considered to be poor. 11/2 Labs:  Na 153, Lactic acid up to 4.37, ammonia wnl, TSH wnl    11/2/2023 CT Head:  FINDINGS:  No acute infarct is seen. There is no apparent mass on unenhanced imaging. There  is no bleed, shift, obstructive hydrocephalus or significant extra-axial fluid  collection. Bone windows are unremarkable. IMPRESSION:  No acute intracranial abnormality. 11/2/2023 CXR:  FINDINGS:  The lungs appear clear. Heart is normal in size. There is no pulmonary edema. There is no evident pneumothorax or pleural effusion. IMPRESSION:  No Acute Disease. Psychosocial: Patient lives alone. Patient has two children. [address] [facility] [facility] visits regularly. Jeremy Thapa lives in [place]. Son says his mom's health has been declining progressively over the last year. She is very frail. They saw Neurology in April for concern of dementia. They were unable to go to follow up in September--patient was wandering around the home and would not get ready for the appointment.  She hasn't been eating and drinking much for a week or so, and her intake had been declining over time prior to

## 2023-11-03 NOTE — FLOWSHEET NOTE
Patient hypotensive and not meeting MAP goal despite fluid resuscitation - 76/44 (55). Order for Heron placed. Son, Yadira Escobar, is in agreement w/ this plan of care. Transfer to telemetry. Nursing aware and will continue to monitor.     Yadira Shawn [phone]

## 2023-11-03 NOTE — CARE COORDINATION
11/03/23 1429   Service Assessment   Patient Orientation Unable to Assess   Cognition Other (see comment)   History Provided By Child/Family   Primary Caregiver Self   Support Systems Children;Family Members;Friends/Neighbors   Patient's Healthcare Decision Maker is: Legal Next of [address] [facility]   PCP Verified by CM Yes  Yovani Lenz MD)   Last Visit to PCP Within last 3 months  (August 2023)   Prior Functional Level Independent in ADLs/IADLs   Current Functional Level Assistance with the following:;Bathing;Dressing; Toileting;Feeding;Cooking;Housework; Shopping;Mobility   Can patient return to prior living arrangement No   Family able to assist with home care needs: No   Would you like for me to discuss the discharge plan with any other family members/significant others, and if so, who? No   Financial Resources Medicare   Social/Functional History   Lives With Alone   Type of Home House   Active  No   Patient's  Info son   Discharge Planning   Type of Residence (!) [address]   Patient expects to be discharged to: Hospice (comment)   Condition of Participation: Discharge Planning   The Plan for Transition of Care is related to the following treatment goals: dehydration, lactic acidosis, hypernatremia, failure to thrive in adult   The Patient and/or Patient Representative was provided with a Choice of Provider? Patient Representative   Name of the Patient Representative who was provided with the Choice of Provider and agrees with the Discharge Plan?  sonBrigette   The Patient and/Or Patient Representative agree with the Discharge Plan? Yes   Freedom of Choice list was provided with basic dialogue that supports the patient's individualized plan of care/goals, treatment preferences, and shares the quality data associated with the providers?   Yes     Ph[facility] [address] [facility] with pt's son, Brigette Serna, who stated his mother has been declining over the last couple years but

## 2023-11-03 NOTE — ED NOTES
TRANSFER - OUT REPORT:    Verbal report given to Reilly Regan on Magi Jonesm  being transferred to [id number] for routine progression of patient care       Report consisted of patient's Situation, Background, Assessment and   Recommendations(SBAR). Information from the following report(s) Nurse Handoff Report, Index, ED SBAR, Adult Overview, MAR, and Recent Results was reviewed with the receiving nurse. Dodson Fall Assessment:    Presents to emergency department  because of falls (Syncope, seizure, or loss of consciousness): No  Age > 70: Yes  Altered Mental Status, Intoxication with alcohol or substance confusion (Disorientation, impaired judgment, poor safety awaremess, or inability to follow instructions): Yes  Impaired Mobility: Ambulates or transfers with assistive devices or assistance; Unable to ambulate or transer.: Yes  Nursing Judgement: Yes          Lines:   Peripheral IV 11/02/23 Right Forearm (Active)   Site Assessment Clean, dry & intact 11/02/23 1653   Line Status Blood return noted 11/02/23 1653   Phlebitis Assessment No symptoms 11/02/23 1653   Infiltration Assessment 0 11/02/23 1653   Dressing Status New dressing applied 11/02/23 1653   Dressing Type Transparent 11/02/23 1653       Peripheral IV 11/02/23 Left Antecubital (Active)   Site Assessment Clean, dry & intact 11/02/23 1653   Line Status Blood return noted;Specimen collected 11/02/23 1653   Phlebitis Assessment No symptoms 11/02/23 1653   Infiltration Assessment 0 11/02/23 1653   Dressing Status New dressing applied 11/02/23 1653   Dressing Type Transparent 11/02/23 1653        Opportunity for questions and clarification was provided.       Patient transported with:  Registered Nurse           Kira Paulino  11/03/23 8901

## 2023-11-03 NOTE — ED NOTES
Patients son Dayana Garcia requested a call if patient is moved out of the ER [phone]     Mesfin Jeffries  11/02/23 2018

## 2023-11-04 PROCEDURE — 94761 N-INVAS EAR/PLS OXIMETRY MLT: CPT

## 2023-11-04 PROCEDURE — 1100000000 HC RM PRIVATE

## 2023-11-04 PROCEDURE — 6370000000 HC RX 637 (ALT 250 FOR IP): Performed by: INTERNAL MEDICINE

## 2023-11-04 PROCEDURE — 2580000003 HC RX 258: Performed by: INTERNAL MEDICINE

## 2023-11-04 RX ADMIN — SODIUM CHLORIDE, PRESERVATIVE FREE 10 ML: 5 INJECTION INTRAVENOUS at 21:00

## 2023-11-04 RX ADMIN — QUETIAPINE FUMARATE 50 MG: 25 TABLET ORAL at 23:11

## 2023-11-04 RX ADMIN — Medication: at 21:00

## 2023-11-05 PROCEDURE — 6360000002 HC RX W HCPCS: Performed by: FAMILY MEDICINE

## 2023-11-05 PROCEDURE — 6370000000 HC RX 637 (ALT 250 FOR IP): Performed by: INTERNAL MEDICINE

## 2023-11-05 PROCEDURE — 2580000003 HC RX 258: Performed by: INTERNAL MEDICINE

## 2023-11-05 PROCEDURE — 1100000000 HC RM PRIVATE

## 2023-11-05 RX ORDER — QUETIAPINE FUMARATE 25 MG/1
50 TABLET, FILM COATED ORAL 2 TIMES DAILY
Status: DISCONTINUED | OUTPATIENT
Start: 2023-11-05 | End: 2023-11-09

## 2023-11-05 RX ADMIN — Medication: at 08:11

## 2023-11-05 RX ADMIN — Medication: at 22:22

## 2023-11-05 RX ADMIN — LORAZEPAM 0.5 MG: 2 INJECTION INTRAMUSCULAR; INTRAVENOUS at 16:40

## 2023-11-05 RX ADMIN — SODIUM CHLORIDE, PRESERVATIVE FREE 10 ML: 5 INJECTION INTRAVENOUS at 22:22

## 2023-11-06 LAB — LACTATE BLD-SCNC: 4.66 MMOL/L (ref 0.4–2)

## 2023-11-06 PROCEDURE — 51798 US URINE CAPACITY MEASURE: CPT

## 2023-11-06 PROCEDURE — 6360000002 HC RX W HCPCS: Performed by: FAMILY MEDICINE

## 2023-11-06 PROCEDURE — 1100000000 HC RM PRIVATE

## 2023-11-06 PROCEDURE — 2580000003 HC RX 258: Performed by: INTERNAL MEDICINE

## 2023-11-06 PROCEDURE — 6370000000 HC RX 637 (ALT 250 FOR IP): Performed by: INTERNAL MEDICINE

## 2023-11-06 PROCEDURE — 94761 N-INVAS EAR/PLS OXIMETRY MLT: CPT

## 2023-11-06 RX ADMIN — QUETIAPINE FUMARATE 50 MG: 25 TABLET ORAL at 20:14

## 2023-11-06 RX ADMIN — SODIUM CHLORIDE, PRESERVATIVE FREE 10 ML: 5 INJECTION INTRAVENOUS at 09:00

## 2023-11-06 RX ADMIN — SODIUM CHLORIDE, PRESERVATIVE FREE 10 ML: 5 INJECTION INTRAVENOUS at 20:16

## 2023-11-06 RX ADMIN — Medication: at 20:19

## 2023-11-06 RX ADMIN — LORAZEPAM 0.5 MG: 2 INJECTION INTRAMUSCULAR; INTRAVENOUS at 21:48

## 2023-11-06 NOTE — CARE COORDINATION
11/6/2023  2:12 PM  Care Management Progress Note      ICD-10-CM    1. Failure to thrive in adult  R62.7       2. Dehydration  E86.0       3. Hypernatremia  E87.0       4. Lactic acidosis  E87.20           RUR:  10%  Risk Level: [x]Low []Moderate []High    Transition of care plan:  Discharge pending dispo. Pt on comfort measures. TBD - CM notified that pt is not currently meeting GIP criteria. CM spoke with pt's son, Devon Jordan, via p/c to discuss facility placement with hospice. Pt's son wished to review his options and discuss with family. SNF and group home listing with prices sent via HIPAA compliant email to pt's son at [email or web address]. Outpatient follow-up. Stretcher transport required.

## 2023-11-07 PROCEDURE — 1100000000 HC RM PRIVATE

## 2023-11-07 PROCEDURE — 97162 PT EVAL MOD COMPLEX 30 MIN: CPT

## 2023-11-07 PROCEDURE — 97530 THERAPEUTIC ACTIVITIES: CPT

## 2023-11-07 PROCEDURE — 94761 N-INVAS EAR/PLS OXIMETRY MLT: CPT

## 2023-11-07 PROCEDURE — 97535 SELF CARE MNGMENT TRAINING: CPT | Performed by: OCCUPATIONAL THERAPIST

## 2023-11-07 PROCEDURE — 97165 OT EVAL LOW COMPLEX 30 MIN: CPT | Performed by: OCCUPATIONAL THERAPIST

## 2023-11-07 PROCEDURE — 6370000000 HC RX 637 (ALT 250 FOR IP): Performed by: INTERNAL MEDICINE

## 2023-11-07 PROCEDURE — 2580000003 HC RX 258: Performed by: INTERNAL MEDICINE

## 2023-11-07 RX ORDER — MORPHINE SULFATE 20 MG/ML
5 SOLUTION ORAL EVERY 4 HOURS
Status: DISCONTINUED | OUTPATIENT
Start: 2023-11-07 | End: 2023-11-09

## 2023-11-07 RX ORDER — LORAZEPAM 2 MG/ML
0.5 CONCENTRATE ORAL EVERY 6 HOURS
Status: DISCONTINUED | OUTPATIENT
Start: 2023-11-07 | End: 2023-11-09

## 2023-11-07 RX ADMIN — MORPHINE SULFATE 5 MG: 100 SOLUTION ORAL at 18:57

## 2023-11-07 RX ADMIN — Medication: at 20:42

## 2023-11-07 RX ADMIN — LORAZEPAM 0.5 MG: 2 SOLUTION, CONCENTRATE ORAL at 20:40

## 2023-11-07 RX ADMIN — QUETIAPINE FUMARATE 50 MG: 25 TABLET ORAL at 07:56

## 2023-11-07 RX ADMIN — SODIUM CHLORIDE, PRESERVATIVE FREE 10 ML: 5 INJECTION INTRAVENOUS at 20:40

## 2023-11-07 RX ADMIN — SODIUM CHLORIDE, PRESERVATIVE FREE 10 ML: 5 INJECTION INTRAVENOUS at 07:56

## 2023-11-07 NOTE — CARE COORDINATION
11/7/2023    3:19 PM  CM notified by attending that he spoke with pt's son, and pt's son wishes for pt to remain on comfort care. Palliative consulted. CM requested [facility] to continue to assess for GIP.     2:43 PM  Care Management Progress Note      ICD-10-CM    1. Failure to thrive in adult  R62.7       2. Dehydration  E86.0       3. Hypernatremia  E87.0       4. Lactic acidosis  E87.20           RUR:  10%  Risk Level: [x]Low []Moderate []High    Transition of care plan:  Discharge pending dispo. Pt on comfort measures. SNF unless pt meets GIP criteria for hospice - CM spoke with pt's son via p/c re dispo. Pt's son reported they are unable to pay out of pocket for LTC/group home services with hospice, nor is the family able to provide pt care if she returns home with hospice. Pt's son requested SNF rehab placement. CM notified son that pt would no longer be under comfort measures, and SNF will focus on recovery for the pt and not comfort. Pt's son expressed an understanding. Attending notified of request. Freedom of choice offered, and pt's son reported [facility] as preference due to location. CM submitted referrals via [address], and awaiting response. Auth will be required, and pt will need to be sitter free/IV pain med/IV haldol free for 24 hours prior to DC. CM encouraged pt's son to use this time to make arrangements for when pt is discharged home from SNF. Outpatient follow-up. Transport need TBD.

## 2023-11-07 NOTE — ACP (ADVANCE CARE PLANNING)
Advance care planning:    Patient has been evaluated by palliative care and is appropriate for hospice. Patient presently was not meeting criteria for GIP. Case management looking into alternative discharge planning. Considering discharge to skilled nursing facility. However patient's blood pressure is unstable in the 68E systolic. She appears very uncomfortable with further forehead and significant agitation when disturbed. Reviewed situation at length with son over the telephone. The patient has been intermittently eating small quantities but not enough to sustain herself. She has been taking medications on occasion but not consistently. The patient's son wants to continue comfort measures, however he says he has no one to take care of her at home with hospice. He does not want to revoke comfort measures and pursue aggressive treatment. He again states that comfort is the top priority. We will add scheduled Roxanol and Intensol sublingually for the patient's discomfort and agitation. Reconsult palliative care for continued assistance with management. Due to hemodynamic instability, the patient may be approaching candidacy for inpatient hospice.     Comfort care only    CODE STATUS DNR    Time spent: 25 minutes

## 2023-11-08 LAB
BACTERIA SPEC CULT: NORMAL
BACTERIA SPEC CULT: NORMAL
SERVICE CMNT-IMP: NORMAL
SERVICE CMNT-IMP: NORMAL

## 2023-11-08 PROCEDURE — 2580000003 HC RX 258: Performed by: INTERNAL MEDICINE

## 2023-11-08 PROCEDURE — 1100000000 HC RM PRIVATE

## 2023-11-08 PROCEDURE — 94761 N-INVAS EAR/PLS OXIMETRY MLT: CPT

## 2023-11-08 PROCEDURE — 6370000000 HC RX 637 (ALT 250 FOR IP): Performed by: INTERNAL MEDICINE

## 2023-11-08 RX ADMIN — SODIUM CHLORIDE, PRESERVATIVE FREE 10 ML: 5 INJECTION INTRAVENOUS at 13:50

## 2023-11-08 RX ADMIN — SODIUM CHLORIDE, PRESERVATIVE FREE 10 ML: 5 INJECTION INTRAVENOUS at 21:06

## 2023-11-08 RX ADMIN — Medication: at 21:10

## 2023-11-08 RX ADMIN — MORPHINE SULFATE 5 MG: 100 SOLUTION ORAL at 13:21

## 2023-11-08 NOTE — CARE COORDINATION
11/8/2023  2:29 PM  Care Management Progress Note      ICD-10-CM    1. Failure to thrive in adult  R62.7       2. Dehydration  E86.0       3. Hypernatremia  E87.0       4. Lactic acidosis  E87.20           RUR:  10%  Risk Level: [x]Low []Moderate []High    Transition of care plan:  Awaiting medical clearance and DC order. Pt on comfort measures. TBD pending medical progression. [facility] notified CM that pt does not meet GIP criteria for Hospice House at this juncture, but will continue to monitor. Attending notified. Pt's son had reported that family is not able to provide care or financial support for pt if pt discharges from the hospital on hospice. Outpatient follow-up. Pt's family to transport.

## 2023-11-09 PROCEDURE — 1100000000 HC RM PRIVATE

## 2023-11-09 PROCEDURE — 6370000000 HC RX 637 (ALT 250 FOR IP): Performed by: INTERNAL MEDICINE

## 2023-11-09 PROCEDURE — 2580000003 HC RX 258: Performed by: INTERNAL MEDICINE

## 2023-11-09 PROCEDURE — 99233 SBSQ HOSP IP/OBS HIGH 50: CPT | Performed by: STUDENT IN AN ORGANIZED HEALTH CARE EDUCATION/TRAINING PROGRAM

## 2023-11-09 RX ORDER — LORAZEPAM 2 MG/ML
0.5 CONCENTRATE ORAL EVERY 4 HOURS PRN
Status: DISCONTINUED | OUTPATIENT
Start: 2023-11-09 | End: 2023-11-14 | Stop reason: HOSPADM

## 2023-11-09 RX ORDER — QUETIAPINE FUMARATE 25 MG/1
25 TABLET, FILM COATED ORAL 2 TIMES DAILY
Status: DISCONTINUED | OUTPATIENT
Start: 2023-11-09 | End: 2023-11-14 | Stop reason: HOSPADM

## 2023-11-09 RX ORDER — OXYCODONE HCL 20 MG/ML
5 CONCENTRATE, ORAL ORAL EVERY 4 HOURS PRN
Status: DISCONTINUED | OUTPATIENT
Start: 2023-11-09 | End: 2023-11-14 | Stop reason: HOSPADM

## 2023-11-09 RX ADMIN — SODIUM CHLORIDE, PRESERVATIVE FREE 10 ML: 5 INJECTION INTRAVENOUS at 09:03

## 2023-11-09 RX ADMIN — QUETIAPINE FUMARATE 25 MG: 25 TABLET ORAL at 21:45

## 2023-11-09 RX ADMIN — Medication: at 09:03

## 2023-11-09 RX ADMIN — SODIUM CHLORIDE, PRESERVATIVE FREE 10 ML: 5 INJECTION INTRAVENOUS at 21:46

## 2023-11-09 RX ADMIN — Medication: at 14:12

## 2023-11-09 RX ADMIN — Medication: at 21:46

## 2023-11-09 NOTE — CONSULTS
Palliative Medicine  Patient Name: Christina Cohen  YOB: 1950  MRN: [id number]  Age: 74 y.o. Gender: female    Date of Initial Consult: 11/3/2023  Date of Service: 11/9/2023  Time: 1:02 PM  Provider: Jas Morel, [address] [facility] [address] Day: 8  Admit Date: 11/2/2023  Referring Provider: Dr. Juliet Walton and Dr. Lloyd Dickinson      Reasons for Consultation:  Goals of Care    HISTORY OF PRESENT ILLNESS (HPI):   Christina Cohen is a 74 y.o. female with cognitive impairment suspected to be d/t dementia, GERD, glaucoma, osteoarthritis, constipation who was admitted on 11/2/2023 from home with a diagnosis of dehydration, hypernatremia, lactic acidosis. She has been receiving IVF and required pippa overnight. Overall picture suspected to be related to failure to thrive d/t advanced dementia. Patient has been refusing food, medications and physical care. Overall prognosis is considered to be poor. 11/2 Labs:  Na 153, Lactic acid up to 4.37, ammonia wnl, TSH wnl    11/2/2023 CT Head:  FINDINGS:  No acute infarct is seen. There is no apparent mass on unenhanced imaging. There  is no bleed, shift, obstructive hydrocephalus or significant extra-axial fluid  collection. Bone windows are unremarkable. IMPRESSION:  No acute intracranial abnormality. 11/2/2023 CXR:  FINDINGS:  The lungs appear clear. Heart is normal in size. There is no pulmonary edema. There is no evident pneumothorax or pleural effusion. IMPRESSION:  No Acute Disease. Psychosocial: Patient lives alone. Patient has two children. [address] [facility] visits regularly. Neris Sifuentes lives in [place]. Son says his mom's health has been declining progressively over the last year. She is very frail. They saw Neurology in April for concern of dementia. They were unable to go to follow up in September--patient was wandering around the home and would not get ready for the appointment.  She hasn't been eating and drinking much for a week or so, and her intake had been declining over time prior to

## 2023-11-09 NOTE — CARE COORDINATION
11/9/2023  1:18 PM  Pt emergently admitted 11/2/23 for weakness, dementia, progressive failure to thrive, pt is hospice appropriate however not GIP at this point discussed in IDR, pt is medically stable for DC  Transitions of Care Plan:  RUR 10 % Low Risk of Readmission/Green  LOS 7 Days   Pt medically stable for DC   Hospice following, appropriate for Hospice, not GIP at this point  CM spoke w/ pt's son Onel Carias, we discussed options for DC, son states family is unable to pay for private 24/7 care at [facility] or adult group home, pt does not have Medicaid/has not applied for Medicaid. Kenan Estevez is unable to take pt home, he stated the plan is for his sister Grady Capone in [place] to come and take pt to live w/ her in next 1-2 weeks.     CM advised pt is medically stable and insurance may deny additional days in hospital if she remains here once stable, he agreed to contact Grady Keyonpalak to update   DC once dispo confirmed  Family will transport   CM will follow and assist  Lesley Medina

## 2023-11-10 PROCEDURE — 6370000000 HC RX 637 (ALT 250 FOR IP): Performed by: INTERNAL MEDICINE

## 2023-11-10 PROCEDURE — 2580000003 HC RX 258: Performed by: INTERNAL MEDICINE

## 2023-11-10 PROCEDURE — 1100000000 HC RM PRIVATE

## 2023-11-10 RX ADMIN — SODIUM CHLORIDE, PRESERVATIVE FREE 10 ML: 5 INJECTION INTRAVENOUS at 08:02

## 2023-11-10 ASSESSMENT — PAIN SCALES - WONG BAKER: WONGBAKER_NUMERICALRESPONSE: 0

## 2023-11-10 NOTE — CARE COORDINATION
11/10/2023  3:12 PM  Care Management Progress Note      ICD-10-CM    1. Failure to thrive in adult  R62.7       2. Dehydration  E86.0       3. Hypernatremia  E87.0       4. Lactic acidosis  E87.20           RUR:  10%  Risk Level: [x]Low []Moderate []High    Transition of care plan:  Awaiting medical clearance and DC order. Pt no longer qualifying for comfort care. PT/OT reconsulted. TBD pending therapy reqs - CM received email from pt's DTR indicating that family's plan is for pt to move to [facility] with the DTR. CM emailed pt's DTR housing resources. Awaiting PT/OT evals. SNF preferred in the interim of pt's moving if pt meets SNF criteria. Outpatient follow-up. Transport need TBD.

## 2023-11-11 PROCEDURE — 94761 N-INVAS EAR/PLS OXIMETRY MLT: CPT

## 2023-11-11 PROCEDURE — 1100000000 HC RM PRIVATE

## 2023-11-11 NOTE — CARE COORDINATION
Met with Shell Schwartz earlier today and confirmed that Hospice has been following peripherally and still needed an information session with Hospice. Gregorio Jones has talked with patient's son and note is in the chart. At this time patient has a sitter, and refusing vitals, meds, not yet appropriate for therapy evaluations which are needed for SNF referrals. Patient has a commercial medicare plan which will need to authorize any SNF placement. CM following. Transition of Care  RUR 10%  1- Therapy referrals pending, which will be needed for an SNF placement  2- Hospice will meet further with family later today when they are bedside. 3- patient will need to be free of a sitter for 24 hours or more before a facility will consider placement. 4- cm to continue to follow.

## 2023-11-12 PROCEDURE — 1100000000 HC RM PRIVATE

## 2023-11-12 PROCEDURE — 2580000003 HC RX 258: Performed by: INTERNAL MEDICINE

## 2023-11-12 PROCEDURE — 6370000000 HC RX 637 (ALT 250 FOR IP): Performed by: INTERNAL MEDICINE

## 2023-11-12 PROCEDURE — 94761 N-INVAS EAR/PLS OXIMETRY MLT: CPT

## 2023-11-12 RX ADMIN — Medication: at 08:20

## 2023-11-12 RX ADMIN — Medication: at 14:18

## 2023-11-12 RX ADMIN — SODIUM CHLORIDE, PRESERVATIVE FREE 10 ML: 5 INJECTION INTRAVENOUS at 08:19

## 2023-11-12 RX ADMIN — Medication: at 20:09

## 2023-11-12 RX ADMIN — SODIUM CHLORIDE, PRESERVATIVE FREE 10 ML: 5 INJECTION INTRAVENOUS at 20:08

## 2023-11-12 RX ADMIN — QUETIAPINE FUMARATE 25 MG: 25 TABLET ORAL at 20:08

## 2023-11-12 RX ADMIN — QUETIAPINE FUMARATE 25 MG: 25 TABLET ORAL at 08:18

## 2023-11-13 LAB
ALBUMIN SERPL-MCNC: 2.5 G/DL (ref 3.5–5)
ALBUMIN/GLOB SERPL: 0.7 (ref 1.1–2.2)
ALP SERPL-CCNC: 36 U/L (ref 45–117)
ALT SERPL-CCNC: 15 U/L (ref 12–78)
ANION GAP SERPL CALC-SCNC: 3 MMOL/L (ref 5–15)
AST SERPL-CCNC: 14 U/L (ref 15–37)
BASOPHILS # BLD: 0.1 K/UL (ref 0–0.1)
BASOPHILS NFR BLD: 1 % (ref 0–1)
BILIRUB SERPL-MCNC: 0.3 MG/DL (ref 0.2–1)
BUN SERPL-MCNC: 12 MG/DL (ref 6–20)
BUN/CREAT SERPL: 25 (ref 12–20)
CALCIUM SERPL-MCNC: 8.7 MG/DL (ref 8.5–10.1)
CHLORIDE SERPL-SCNC: 106 MMOL/L (ref 97–108)
CO2 SERPL-SCNC: 31 MMOL/L (ref 21–32)
CREAT SERPL-MCNC: 0.48 MG/DL (ref 0.55–1.02)
DIFFERENTIAL METHOD BLD: ABNORMAL
EOSINOPHIL # BLD: 0.1 K/UL (ref 0–0.4)
EOSINOPHIL NFR BLD: 2 % (ref 0–7)
ERYTHROCYTE [DISTWIDTH] IN BLOOD BY AUTOMATED COUNT: 18.3 % (ref 11.5–14.5)
GLOBULIN SER CALC-MCNC: 3.4 G/DL (ref 2–4)
GLUCOSE SERPL-MCNC: 91 MG/DL (ref 65–100)
HCT VFR BLD AUTO: 24.6 % (ref 35–47)
HGB BLD-MCNC: 7.5 G/DL (ref 11.5–16)
IMM GRANULOCYTES # BLD AUTO: 0.1 K/UL (ref 0–0.04)
IMM GRANULOCYTES NFR BLD AUTO: 2 % (ref 0–0.5)
LYMPHOCYTES # BLD: 1 K/UL (ref 0.8–3.5)
LYMPHOCYTES NFR BLD: 22 % (ref 12–49)
MAGNESIUM SERPL-MCNC: 2 MG/DL (ref 1.6–2.4)
MCH RBC QN AUTO: 25.2 PG (ref 26–34)
MCHC RBC AUTO-ENTMCNC: 30.5 G/DL (ref 30–36.5)
MCV RBC AUTO: 82.6 FL (ref 80–99)
MONOCYTES # BLD: 0.5 K/UL (ref 0–1)
MONOCYTES NFR BLD: 11 % (ref 5–13)
NEUTS SEG # BLD: 2.8 K/UL (ref 1.8–8)
NEUTS SEG NFR BLD: 62 % (ref 32–75)
NRBC # BLD: 0 K/UL (ref 0–0.01)
NRBC BLD-RTO: 0 PER 100 WBC
PHOSPHATE SERPL-MCNC: 3.6 MG/DL (ref 2.6–4.7)
PLATELET # BLD AUTO: 192 K/UL (ref 150–400)
PMV BLD AUTO: 10.6 FL (ref 8.9–12.9)
POTASSIUM SERPL-SCNC: 4.1 MMOL/L (ref 3.5–5.1)
PROT SERPL-MCNC: 5.9 G/DL (ref 6.4–8.2)
RBC # BLD AUTO: 2.98 M/UL (ref 3.8–5.2)
SODIUM SERPL-SCNC: 140 MMOL/L (ref 136–145)
WBC # BLD AUTO: 4.4 K/UL (ref 3.6–11)

## 2023-11-13 PROCEDURE — 36415 COLL VENOUS BLD VENIPUNCTURE: CPT

## 2023-11-13 PROCEDURE — 94761 N-INVAS EAR/PLS OXIMETRY MLT: CPT

## 2023-11-13 PROCEDURE — 1100000000 HC RM PRIVATE

## 2023-11-13 PROCEDURE — 6370000000 HC RX 637 (ALT 250 FOR IP): Performed by: INTERNAL MEDICINE

## 2023-11-13 PROCEDURE — 2580000003 HC RX 258: Performed by: INTERNAL MEDICINE

## 2023-11-13 PROCEDURE — 97535 SELF CARE MNGMENT TRAINING: CPT

## 2023-11-13 PROCEDURE — 97116 GAIT TRAINING THERAPY: CPT

## 2023-11-13 PROCEDURE — 83735 ASSAY OF MAGNESIUM: CPT

## 2023-11-13 PROCEDURE — 85025 COMPLETE CBC W/AUTO DIFF WBC: CPT

## 2023-11-13 PROCEDURE — 80053 COMPREHEN METABOLIC PANEL: CPT

## 2023-11-13 PROCEDURE — 97530 THERAPEUTIC ACTIVITIES: CPT

## 2023-11-13 PROCEDURE — 84100 ASSAY OF PHOSPHORUS: CPT

## 2023-11-13 RX ADMIN — Medication: at 17:23

## 2023-11-13 RX ADMIN — QUETIAPINE FUMARATE 25 MG: 25 TABLET ORAL at 08:47

## 2023-11-13 RX ADMIN — SODIUM CHLORIDE, PRESERVATIVE FREE 10 ML: 5 INJECTION INTRAVENOUS at 08:47

## 2023-11-13 RX ADMIN — SODIUM CHLORIDE, PRESERVATIVE FREE 10 ML: 5 INJECTION INTRAVENOUS at 20:48

## 2023-11-13 RX ADMIN — Medication: at 20:48

## 2023-11-13 RX ADMIN — Medication: at 08:47

## 2023-11-13 NOTE — CARE COORDINATION
11/13/2023 2:34 PM Updated therapy notes reviewed. CM called to [facility], spoke with Myles who confirmed they can accept pt. CM called to pt's son, Devon Jordan and pt's daughter, Jeremy Thapa separately, confirmed discharge plan of SNF placement and pt to move in with her daughter Jeremy Thapa at the end of SNF placement. CM relayed [facility] can accept, pt's son and daughter are agreeable to this facility. CM called back to Gladys at [facility], requested auth be started for pt's admission. Confirmed with pt's RN and charge RN, sitter will be removed today. SHAZIA Keller     11/13/2023  11:47 AM   Care Management Progress Note      ICD-10-CM    1. Failure to thrive in adult  R62.7       2. Dehydration  E86.0       3. Hypernatremia  E87.0       4. Lactic acidosis  E87.20           RUR:  10%  Risk Level: [x]Low []Moderate []High    Transition of care plan:  Ongoing medical management  Pt does not qualify for hospice GIP, pt does not have family nor funds to cover placement with hospice. Anticipating to pt move to live with pt's daughter in the near future  Pt will need SNF placement, will need to be sitter free and auth will be required. Need updated PT and OT notes. Accepted by [id number] being started today  Outpatient follow-up.   TBD on transport at discharge

## 2023-11-14 VITALS
BODY MASS INDEX: 14.63 KG/M2 | WEIGHT: 79.5 LBS | TEMPERATURE: 98.2 F | SYSTOLIC BLOOD PRESSURE: 109 MMHG | OXYGEN SATURATION: 100 % | RESPIRATION RATE: 16 BRPM | DIASTOLIC BLOOD PRESSURE: 68 MMHG | HEIGHT: 62 IN | HEART RATE: 104 BPM

## 2023-11-14 PROBLEM — R62.7 FAILURE TO THRIVE IN ADULT: Status: ACTIVE | Noted: 2023-11-14

## 2023-11-14 LAB
ERYTHROCYTE [DISTWIDTH] IN BLOOD BY AUTOMATED COUNT: 18.3 % (ref 11.5–14.5)
FERRITIN SERPL-MCNC: 10 NG/ML (ref 26–388)
HCT VFR BLD AUTO: 26.2 % (ref 35–47)
HGB BLD-MCNC: 7.9 G/DL (ref 11.5–16)
IRON SATN MFR SERPL: 6 % (ref 20–50)
IRON SERPL-MCNC: 16 UG/DL (ref 35–150)
MCH RBC QN AUTO: 25.2 PG (ref 26–34)
MCHC RBC AUTO-ENTMCNC: 30.2 G/DL (ref 30–36.5)
MCV RBC AUTO: 83.7 FL (ref 80–99)
NRBC # BLD: 0 K/UL (ref 0–0.01)
NRBC BLD-RTO: 0 PER 100 WBC
PLATELET # BLD AUTO: 191 K/UL (ref 150–400)
PMV BLD AUTO: 9.4 FL (ref 8.9–12.9)
RBC # BLD AUTO: 3.13 M/UL (ref 3.8–5.2)
TIBC SERPL-MCNC: 272 UG/DL (ref 250–450)
WBC # BLD AUTO: 4.4 K/UL (ref 3.6–11)

## 2023-11-14 PROCEDURE — 83540 ASSAY OF IRON: CPT

## 2023-11-14 PROCEDURE — 2580000003 HC RX 258: Performed by: INTERNAL MEDICINE

## 2023-11-14 PROCEDURE — 85027 COMPLETE CBC AUTOMATED: CPT

## 2023-11-14 PROCEDURE — 6370000000 HC RX 637 (ALT 250 FOR IP): Performed by: INTERNAL MEDICINE

## 2023-11-14 PROCEDURE — 36415 COLL VENOUS BLD VENIPUNCTURE: CPT

## 2023-11-14 PROCEDURE — 97530 THERAPEUTIC ACTIVITIES: CPT

## 2023-11-14 PROCEDURE — 83550 IRON BINDING TEST: CPT

## 2023-11-14 PROCEDURE — 82728 ASSAY OF FERRITIN: CPT

## 2023-11-14 RX ORDER — QUETIAPINE FUMARATE 25 MG/1
25 TABLET, FILM COATED ORAL 2 TIMES DAILY
Qty: 60 TABLET | Refills: 0 | Status: SHIPPED | OUTPATIENT
Start: 2023-11-14

## 2023-11-14 RX ADMIN — QUETIAPINE FUMARATE 25 MG: 25 TABLET ORAL at 09:16

## 2023-11-14 RX ADMIN — Medication: at 09:25

## 2023-11-14 RX ADMIN — SODIUM CHLORIDE, PRESERVATIVE FREE 10 ML: 5 INJECTION INTRAVENOUS at 09:24

## 2023-11-14 NOTE — DISCHARGE INSTRUCTIONS
day. If you are not hungry, try snacks or nutritional drinks such as Boost, Ensure, or Sustacal.  If you have problems sleeping:  Try not to nap too close to your bedtime. Exercise regularly. Walking is a good choice. Try a glass of warm milk or caffeine-free herbal tea before bed. Do tasks and activities during the time of day when you feel your best. It may help to develop a daily routine. Post labels, lists, and sticky notes to help you remember things. Write your activities on a calendar you can easily find. Put your clock where you can easily see it. Stay active. Take walks in familiar places, or with friends or loved ones. Try to stay active mentally too. Read and work crossword puzzles if you enjoy these activities. Do not drive unless you can pass an on-road driving test. If you are not sure if you are safe to drive, your state 's license bureau can test you. Keep a cordless phone and a flashlight with new batteries by your bed. If possible, put a phone in each of the main rooms of your house, or carry a cell phone in case you fall and cannot reach a phone. Or, you can wear a device around your neck or wrist. You push a button that sends a signal for help. Acknowledge your emotions and plan for the future  Talk openly and honestly with your doctor. Let yourself grieve. It is common to feel angry, scared, frustrated, anxious, or depressed. Get emotional support from family, friends, a support group, or a counselor experienced in working with people who have dementia. Ask for help if you need it. Tell your doctor how you feel. You may feel upset, angry, or worried at times. Many things can cause this, including poor sleep, medicine side effects, confusion, and pain. Your doctor may be able to help you. Plan for the future. Talk to your family and doctor about preparing a living will and other important papers while you can make decisions.  These papers tell your doctors how to care for you

## 2023-11-14 NOTE — PLAN OF CARE
Fed self breakfast today.  Repositioned for skin integrity
Patient refusing most care, meals, assessments. Problem: Safety - Adult  Goal: Free from fall injury  Outcome: Not Progressing     Problem: Skin/Tissue Integrity  Goal: Absence of new skin breakdown  Description: 1. Monitor for areas of redness and/or skin breakdown  2. Assess vascular access sites hourly  3. Every 4-6 hours minimum:  Change oxygen saturation probe site  4. Every 4-6 hours:  If on nasal continuous positive airway pressure, respiratory therapy assess nares and determine need for appliance change or resting period.   Outcome: Not Progressing     Problem: Nutrition Deficit:  Goal: Optimize nutritional status  Outcome: Not Progressing     Problem: Pain  Goal: Verbalizes/displays adequate comfort level or baseline comfort level  Outcome: Not Progressing
Problem: Occupational Therapy - Adult  Goal: By Discharge: Performs self-care activities at highest level of function for planned discharge setting. See evaluation for individualized goals. Description: FUNCTIONAL STATUS PRIOR TO ADMISSION:  Patient lived alone with her son checking in on her and bringing groceries. Advanced dementia and \"failure to thrive\" at 79lbs. Unsure of functional level, yet not able to care for herself    HOME SUPPORT PRIOR TO ADMISSION: The patient lived alone with son to provide assistance. ? Ability to care for herself due to advanced dementia    Occupational Therapy Goals:  Initiated 11/7/2023  1. Patient will follow one step commands with min encouragement within 7 day(s). 2.  Patient will sit edge of bed or in chair and feed self > 50% of meal with supervision within 7 day(s). 3.  Patient will wash her face and brush her hair with set up and min cues seated edge of bed within 7 day(s). 4.  Patient will transfer to and from toilet with CGA and min cues within 7 day(s). Outcome: Progressing     OCCUPATIONAL THERAPY EVALUATION    Patient: Leta Moe (63 y.o. female)  Date: 11/7/2023  Primary Diagnosis: Dehydration [E86.0]  Lactic acidosis [E87.20]  Hypernatremia [E87.0]  Failure to thrive in adult [R62.7]         Precautions: Fall Risk, Other (comment) (confusion/agitation)                  ASSESSMENT :  The patient is limited by decreased functional mobility, independence in ADLs, high-level IADLs, ROM, body mechanics, activity tolerance, safety awareness, command following, confusion, irritability, decreased comprehension of role of therapy. Patient with low blood pressure on arrival, however maintained in sitting and without complaint of lightheadedness. 70's/50's in supine, 70's/60's seated. Based on the impairments listed above she is overall total assist for attempts to facilitate participation in functional tasks and irritable.  Spontaneous performance of mobility
Problem: Occupational Therapy - Adult  Goal: By Discharge: Performs self-care activities at highest level of function for planned discharge setting. See evaluation for individualized goals. Description: FUNCTIONAL STATUS PRIOR TO ADMISSION:  Patient lived alone with her son checking in on her and bringing groceries. Advanced dementia and \"failure to thrive\" at 79lbs. Unsure of functional level, yet not able to care for herself    HOME SUPPORT PRIOR TO ADMISSION: The patient lived alone with son to provide assistance. ? Ability to care for herself due to advanced dementia    Occupational Therapy Goals:  Initiated 11/7/2023; goals reviewed and continued 11/14/2023 for consistency  1. Patient will follow one step commands with min encouragement within 7 day(s). 2.  Patient will sit edge of bed or in chair and feed self > 50% of meal with supervision within 7 day(s). 3.  Patient will wash her face and brush her hair with set up and min cues seated edge of bed within 7 day(s). 4.  Patient will transfer to and from toilet with CGA and min cues within 7 day(s). 11/14/2023 1127 by Ebonie Goins OT  Outcome: Progressing     Problem: Safety - Adult  Goal: Free from fall injury  11/14/2023 1433 by Tyshawn Spangler RN  Outcome: Adequate for Discharge  11/14/2023 0201 by Hansel Aguilar LPN  Outcome: Progressing     Problem: Skin/Tissue Integrity  Goal: Absence of new skin breakdown  Description: 1. Monitor for areas of redness and/or skin breakdown  2. Assess vascular access sites hourly  3. Every 4-6 hours minimum:  Change oxygen saturation probe site  4. Every 4-6 hours:  If on nasal continuous positive airway pressure, respiratory therapy assess nares and determine need for appliance change or resting period.   11/14/2023 1433 by Tyshawn Spangler RN  Outcome: Adequate for Discharge  11/14/2023 0201 by Hansel Aguilar LPN  Outcome: Progressing     Problem: Nutrition Deficit:  Goal: Optimize
Problem: Occupational Therapy - Adult  Goal: By Discharge: Performs self-care activities at highest level of function for planned discharge setting. See evaluation for individualized goals. Description: FUNCTIONAL STATUS PRIOR TO ADMISSION:  Patient lived alone with her son checking in on her and bringing groceries. Advanced dementia and \"failure to thrive\" at 79lbs. Unsure of functional level, yet not able to care for herself    HOME SUPPORT PRIOR TO ADMISSION: The patient lived alone with son to provide assistance. ? Ability to care for herself due to advanced dementia    Occupational Therapy Goals:  Initiated 11/7/2023; goals reviewed and continued 11/14/2023 for consistency  1. Patient will follow one step commands with min encouragement within 7 day(s). 2.  Patient will sit edge of bed or in chair and feed self > 50% of meal with supervision within 7 day(s). 3.  Patient will wash her face and brush her hair with set up and min cues seated edge of bed within 7 day(s). 4.  Patient will transfer to and from toilet with CGA and min cues within 7 day(s). Outcome: Progressing     OCCUPATIONAL THERAPY TREATMENT: WEEKLY REASSESSMENT    Patient: Rigoberto Ramos (83 y.o. female)  Date: 11/14/2023  Primary Diagnosis: Dehydration [E86.0]  Lactic acidosis [E87.20]  Hypernatremia [E87.0]  Failure to thrive in adult [R62.7]       Precautions: Fall Risk, Other (comment) (confusion/agitation)                Chart, occupational therapy assessment, plan of care, and goals were reviewed. ASSESSMENT  Patient received on commode with PCT agreeable for OT treatment/weekly reassessment 2/2 LOS. Patient continues to benefit from skilled OT services and is progressing towards goals. Patient calm, cooperative and pleasant.  Patient presents with decreased balance (good standing balance with out support at sink and while finishing up meal standing at EOB, fair dynamic standing balance with gait belt), decreased safety
Problem: Physical Therapy - Adult  Goal: By Discharge: Performs mobility at highest level of function for planned discharge setting. See evaluation for individualized goals. Description: FUNCTIONAL STATUS PRIOR TO ADMISSION:  pt lived alone with son checking in on her and bringing groceries. Advanced dementia and \"failure to thrive\" at 79lbs. Unsure of functional level, yet not able to care for herself    HOME SUPPORT PRIOR TO ADMISSION: The patient lived alone with son to provide assistance. Physical Therapy Goals  Initiated 11/7/2023  1. Patient will move from supine to sit and sit to supine in bed with contact guard assist within 7 day(s). 2.  Patient will perform sit to stand with minimal assistance within 7 day(s). 3.  Patient will transfer from bed to chair and chair to bed with minimal assistance using the least restrictive device within 7 day(s). 4.  Patient will ambulate with minimal assistance for 10x2 feet with the least restrictive device within 7 day(s). Outcome: Progressing   PHYSICAL THERAPY EVALUATION    Patient: Stacie Albarran (12 y.o. female)  Date: 11/7/2023  Primary Diagnosis: Dehydration [E86.0]  Lactic acidosis [E87.20]  Hypernatremia [E87.0]  Failure to thrive in adult [R62.7]       Precautions: Fall Risk                    ASSESSMENT :   DEFICITS/IMPAIRMENTS:   The patient is limited by decreased functional mobility, independence in ADLs, strength, activity tolerance, safety awareness, cognition, command following, coordination, balance, orthostatic hypotension, increased pain levels in LUE, significant dementia, resistant and combative to assistance. She is admitted due to failure to thrive at 73lbs. Based on the impairments listed above pt is needing Mod assistance of 2 due to high fall risk, advanced dementia, combative and uncooperative nature. When agreeable to mobilize pt able to sit self up on EOB from supine with Min A x2. She is impulsive and HIGH fall risk.
Problem: Physical Therapy - Adult  Goal: By Discharge: Performs mobility at highest level of function for planned discharge setting. See evaluation for individualized goals. Description: FUNCTIONAL STATUS PRIOR TO ADMISSION:  pt lived alone with son checking in on her and bringing groceries. Advanced dementia and \"failure to thrive\" at 79lbs. Unsure of functional level, yet not able to care for herself    HOME SUPPORT PRIOR TO ADMISSION: The patient lived alone with son to provide assistance. Physical Therapy Goals  Initiated 11/7/2023  1. Patient will move from supine to sit and sit to supine in bed with contact guard assist within 7 day(s). 2.  Patient will perform sit to stand with minimal assistance within 7 day(s). 3.  Patient will transfer from bed to chair and chair to bed with minimal assistance using the least restrictive device within 7 day(s). 4.  Patient will ambulate with minimal assistance for 10x2 feet with the least restrictive device within 7 day(s). Outcome: Progressing   PHYSICAL THERAPY NOTE    Patient: Meera Davis (89 y.o. female)  Date: 11/13/2023  Diagnosis: Dehydration [E86.0]  Lactic acidosis [E87.20]  Hypernatremia [E87.0]  Failure to thrive in adult [R62.7] Lactic acidosis      Precautions: Fall Risk, Other (comment) (confusion/agitation)                    ASSESSMENT:  Patient has been followed by skilled PT services and IS progressing towards goals. Pt limited by impaired cognition, impulsive and high fall risk, will need 24/7 assist for all transfers and gait. Today, Pt requesting to use bathroom and required CGA/Min A without use of AD for gait training, tho pts participation and level of assist has varied during admission. PLAN:  Patient continues to benefit from skilled intervention to address the above impairments. Continue treatment per established plan of care.     Recommendation for discharge: (in order for the patient to meet his/her long term goals):
Problem: Safety - Adult  Goal: Free from fall injury  11/10/2023 0153 by Jaron Christianson RN  Outcome: Progressing  11/9/2023 1800 by Alberto Moreno RN  Outcome: Progressing     Problem: Skin/Tissue Integrity  Goal: Absence of new skin breakdown  Description: 1. Monitor for areas of redness and/or skin breakdown  2. Assess vascular access sites hourly  3. Every 4-6 hours minimum:  Change oxygen saturation probe site  4. Every 4-6 hours:  If on nasal continuous positive airway pressure, respiratory therapy assess nares and determine need for appliance change or resting period.   11/10/2023 0153 by Jacquelin LEE RN  Outcome: Progressing  11/9/2023 1800 by Alberto Moreno RN  Outcome: Progressing     Problem: Nutrition Deficit:  Goal: Optimize nutritional status  11/10/2023 0153 by Jaron Christianson RN  Outcome: Progressing  11/9/2023 1800 by Alberto Moreno RN  Outcome: Progressing     Problem: Pain  Goal: Verbalizes/displays adequate comfort level or baseline comfort level  11/10/2023 0153 by Jaron Christianson RN  Outcome: Progressing  11/9/2023 1800 by Alberto Moreno RN  Outcome: Progressing     Problem: Discharge Planning  Goal: Discharge to home or other facility with appropriate resources  11/10/2023 0153 by Jaron Christianson RN  Outcome: Progressing  11/9/2023 1800 by Alberto Moreno RN  Outcome: Progressing
Problem: Safety - Adult  Goal: Free from fall injury  11/10/2023 1728 by Steph Perez RN  Outcome: Progressing  11/10/2023 0818 by Miguel Martel RN  Outcome: Not Progressing     Problem: Skin/Tissue Integrity  Goal: Absence of new skin breakdown  Description: 1. Monitor for areas of redness and/or skin breakdown  2. Assess vascular access sites hourly  3. Every 4-6 hours minimum:  Change oxygen saturation probe site  4. Every 4-6 hours:  If on nasal continuous positive airway pressure, respiratory therapy assess nares and determine need for appliance change or resting period.   11/10/2023 1728 by Steph Perez RN  Outcome: Progressing  11/10/2023 0818 by Miguel Martel RN  Outcome: Not Progressing     Problem: Nutrition Deficit:  Goal: Optimize nutritional status  11/10/2023 1728 by Steph Perez RN  Outcome: Progressing  11/10/2023 0818 by Miguel Martel RN  Outcome: Not Progressing     Problem: Pain  Goal: Verbalizes/displays adequate comfort level or baseline comfort level  11/10/2023 1728 by Steph Perez RN  Outcome: Progressing  11/10/2023 0818 by Miguel Martel RN  Outcome: Not Progressing     Problem: Discharge Planning  Goal: Discharge to home or other facility with appropriate resources  11/10/2023 1728 by Steph Perez RN  Outcome: Progressing  11/10/2023 0818 by Miguel Martel RN  Outcome: Not Progressing
Problem: Safety - Adult  Goal: Free from fall injury  11/5/2023 0956 by Deedee Anthony RN  Outcome: Progressing  11/5/2023 0542 by Jayna Platt RN  Outcome: Progressing     Problem: Skin/Tissue Integrity  Goal: Absence of new skin breakdown  Description: 1. Monitor for areas of redness and/or skin breakdown  2. Assess vascular access sites hourly  3. Every 4-6 hours minimum:  Change oxygen saturation probe site  4. Every 4-6 hours:  If on nasal continuous positive airway pressure, respiratory therapy assess nares and determine need for appliance change or resting period.   11/5/2023 0956 by Deedee Anthony RN  Outcome: Progressing  11/5/2023 0542 by Naomi LEE RN  Outcome: Progressing     Problem: Nutrition Deficit:  Goal: Optimize nutritional status  Outcome: Progressing     Problem: Pain  Goal: Verbalizes/displays adequate comfort level or baseline comfort level  11/5/2023 0956 by Deedee Anthony RN  Outcome: Progressing  11/5/2023 0542 by Jayna Platt RN  Outcome: Progressing     Problem: Discharge Planning  Goal: Discharge to home or other facility with appropriate resources  11/5/2023 0956 by Deedee Anthony RN  Outcome: Progressing  11/5/2023 0542 by Jayna Platt RN  Outcome: Progressing
Problem: Safety - Adult  Goal: Free from fall injury  11/7/2023 0786 by Orlando Black, RN  Outcome: Progressing  11/6/2023 2047 by Jorge Rushing, RN  Outcome: Progressing     Problem: Skin/Tissue Integrity  Goal: Absence of new skin breakdown  Description: 1. Monitor for areas of redness and/or skin breakdown  2. Assess vascular access sites hourly  3. Every 4-6 hours minimum:  Change oxygen saturation probe site  4. Every 4-6 hours:  If on nasal continuous positive airway pressure, respiratory therapy assess nares and determine need for appliance change or resting period.   Outcome: Progressing
Problem: Safety - Adult  Goal: Free from fall injury  11/7/2023 1038 by Gold Shafer LPN  Outcome: Progressing  11/7/2023 8866 by Peggy Rao RN  Outcome: Progressing  11/6/2023 2047 by Vladimir Lemon RN  Outcome: Progressing     Problem: Skin/Tissue Integrity  Goal: Absence of new skin breakdown  Description: 1. Monitor for areas of redness and/or skin breakdown  2. Assess vascular access sites hourly  3. Every 4-6 hours minimum:  Change oxygen saturation probe site  4. Every 4-6 hours:  If on nasal continuous positive airway pressure, respiratory therapy assess nares and determine need for appliance change or resting period.   11/7/2023 1038 by Gold Shafer LPN  Outcome: Progressing  11/7/2023 2307 by Peggy Rao RN  Outcome: Progressing     Problem: Nutrition Deficit:  Goal: Optimize nutritional status  Outcome: Progressing     Problem: Pain  Goal: Verbalizes/displays adequate comfort level or baseline comfort level  Outcome: Progressing     Problem: Discharge Planning  Goal: Discharge to home or other facility with appropriate resources  Outcome: Progressing
Problem: Safety - Adult  Goal: Free from fall injury  11/9/2023 1017 by Leona Eisenmenger, RN  Outcome: Progressing  11/9/2023 0515 by Erika Navarro LPN  Outcome: Progressing     Problem: Skin/Tissue Integrity  Goal: Absence of new skin breakdown  Description: 1. Monitor for areas of redness and/or skin breakdown  2. Assess vascular access sites hourly  3. Every 4-6 hours minimum:  Change oxygen saturation probe site  4. Every 4-6 hours:  If on nasal continuous positive airway pressure, respiratory therapy assess nares and determine need for appliance change or resting period.   11/9/2023 1017 by Leona Eisenmenger, RN  Outcome: Progressing  11/9/2023 0515 by Erika Navarro LPN  Outcome: Progressing     Problem: Nutrition Deficit:  Goal: Optimize nutritional status  11/9/2023 1017 by Leona Eisenmenger, RN  Outcome: Progressing  11/9/2023 0515 by Erika Navarro LPN  Outcome: Progressing     Problem: Pain  Goal: Verbalizes/displays adequate comfort level or baseline comfort level  11/9/2023 1017 by Leona Eisenmenger, RN  Outcome: Progressing  11/9/2023 0515 by Erika Navarro LPN  Outcome: Progressing     Problem: Discharge Planning  Goal: Discharge to home or other facility with appropriate resources  11/9/2023 1017 by Leona Eisenmenger, RN  Outcome: Progressing  11/9/2023 0515 by Erika Navarro LPN  Outcome: Progressing
Problem: Safety - Adult  Goal: Free from fall injury  11/9/2023 1800 by Mckay Viveros RN  Outcome: Progressing  11/9/2023 1017 by Martin Hobson RN  Outcome: Progressing  11/9/2023 0515 by Sofia Madden LPN  Outcome: Progressing     Problem: Skin/Tissue Integrity  Goal: Absence of new skin breakdown  Description: 1. Monitor for areas of redness and/or skin breakdown  2. Assess vascular access sites hourly  3. Every 4-6 hours minimum:  Change oxygen saturation probe site  4. Every 4-6 hours:  If on nasal continuous positive airway pressure, respiratory therapy assess nares and determine need for appliance change or resting period.   11/9/2023 1800 by Mkcay Viveros RN  Outcome: Progressing  11/9/2023 1017 by Martin Hobson RN  Outcome: Progressing  11/9/2023 0515 by Sofia Madden LPN  Outcome: Progressing     Problem: Nutrition Deficit:  Goal: Optimize nutritional status  11/9/2023 1800 by Mckay Viveros RN  Outcome: Progressing  11/9/2023 1017 by Martin Hobson RN  Outcome: Progressing  11/9/2023 0515 by Sofia Madden LPN  Outcome: Progressing     Problem: Pain  Goal: Verbalizes/displays adequate comfort level or baseline comfort level  11/9/2023 1800 by Mckay Viveros RN  Outcome: Progressing  11/9/2023 1017 by Martin Hobson RN  Outcome: Progressing  11/9/2023 0515 by Sofia Madden LPN  Outcome: Progressing     Problem: Discharge Planning  Goal: Discharge to home or other facility with appropriate resources  11/9/2023 1800 by Mckay Viveros RN  Outcome: Progressing  11/9/2023 1017 by Martin Hobson RN  Outcome: Progressing  11/9/2023 0515 by Sofia Madden LPN  Outcome: Progressing
Problem: Safety - Adult  Goal: Free from fall injury  Outcome: Progressing
Problem: Safety - Adult  Goal: Free from fall injury  Outcome: Progressing     Problem: Skin/Tissue Integrity  Goal: Absence of new skin breakdown  Description: 1. Monitor for areas of redness and/or skin breakdown  2. Assess vascular access sites hourly  3. Every 4-6 hours minimum:  Change oxygen saturation probe site  4. Every 4-6 hours:  If on nasal continuous positive airway pressure, respiratory therapy assess nares and determine need for appliance change or resting period.   Outcome: Progressing     Problem: Discharge Planning  Goal: Discharge to home or other facility with appropriate resources  Outcome: Progressing
Problem: Safety - Adult  Goal: Free from fall injury  Outcome: Progressing     Problem: Skin/Tissue Integrity  Goal: Absence of new skin breakdown  Description: 1. Monitor for areas of redness and/or skin breakdown  2. Assess vascular access sites hourly  3. Every 4-6 hours minimum:  Change oxygen saturation probe site  4. Every 4-6 hours:  If on nasal continuous positive airway pressure, respiratory therapy assess nares and determine need for appliance change or resting period.   Outcome: Progressing     Problem: Nutrition Deficit:  Goal: Optimize nutritional status  Outcome: Progressing     Problem: Pain  Goal: Verbalizes/displays adequate comfort level or baseline comfort level  Outcome: Progressing
Problem: Safety - Adult  Goal: Free from fall injury  Outcome: Progressing     Problem: Skin/Tissue Integrity  Goal: Absence of new skin breakdown  Description: 1. Monitor for areas of redness and/or skin breakdown  2. Assess vascular access sites hourly  3. Every 4-6 hours minimum:  Change oxygen saturation probe site  4. Every 4-6 hours:  If on nasal continuous positive airway pressure, respiratory therapy assess nares and determine need for appliance change or resting period.   Outcome: Progressing     Problem: Nutrition Deficit:  Goal: Optimize nutritional status  Outcome: Progressing     Problem: Pain  Goal: Verbalizes/displays adequate comfort level or baseline comfort level  Outcome: Progressing     Problem: Discharge Planning  Goal: Discharge to home or other facility with appropriate resources  Outcome: Progressing
Problem: Safety - Adult  Goal: Free from fall injury  Outcome: Progressing     Problem: Skin/Tissue Integrity  Goal: Absence of new skin breakdown  Description: 1. Monitor for areas of redness and/or skin breakdown  2. Assess vascular access sites hourly  3. Every 4-6 hours minimum:  Change oxygen saturation probe site  4. Every 4-6 hours:  If on nasal continuous positive airway pressure, respiratory therapy assess nares and determine need for appliance change or resting period.   Outcome: Progressing     Problem: Nutrition Deficit:  Goal: Optimize nutritional status  Outcome: Progressing     Problem: Pain  Goal: Verbalizes/displays adequate comfort level or baseline comfort level  Outcome: Progressing     Problem: Discharge Planning  Goal: Discharge to home or other facility with appropriate resources  Outcome: Progressing
Problem: Safety - Adult  Goal: Free from fall injury  Outcome: Progressing     Problem: Skin/Tissue Integrity  Goal: Absence of new skin breakdown  Description: 1. Monitor for areas of redness and/or skin breakdown  2. Assess vascular access sites hourly  3. Every 4-6 hours minimum:  Change oxygen saturation probe site  4. Every 4-6 hours:  If on nasal continuous positive airway pressure, respiratory therapy assess nares and determine need for appliance change or resting period.   Outcome: Progressing     Problem: Pain  Goal: Verbalizes/displays adequate comfort level or baseline comfort level  Outcome: Progressing     Problem: Discharge Planning  Goal: Discharge to home or other facility with appropriate resources  Outcome: Progressing
Problem: Safety - Adult  Goal: Free from fall injury  Outcome: Progressing     Problem: Skin/Tissue Integrity  Goal: Absence of new skin breakdown  Description: 1. Monitor for areas of redness and/or skin breakdown  2. Assess vascular access sites hourly  3. Every 4-6 hours minimum:  Change oxygen saturation probe site  4. Every 4-6 hours:  If on nasal continuous positive airway pressure, respiratory therapy assess nares and determine need for appliance change or resting period.   Outcome: Progressing     Problem: Pain  Goal: Verbalizes/displays adequate comfort level or baseline comfort level  Outcome: Progressing     Problem: Discharge Planning  Goal: Discharge to home or other facility with appropriate resources  Outcome: Progressing
Problem: Safety - Adult  Goal: Free from fall injury  Outcome: Progressing     Problem: Skin/Tissue Integrity  Goal: Absence of new skin breakdown  Description: 1. Monitor for areas of redness and/or skin breakdown  2. Assess vascular access sites hourly  3. Every 4-6 hours minimum:  Change oxygen saturation probe site  4. Every 4-6 hours:  If on nasal continuous positive airway pressure, respiratory therapy assess nares and determine need for appliance change or resting period. Outcome: Progressing     Problem: Nutrition Deficit:  Goal: Optimize nutritional status  Outcome: Progressing     Problem: Pain  Goal: Verbalizes/displays adequate comfort level or baseline comfort level  Outcome: Progressing     Problem: Discharge Planning  Goal: Discharge to home or other facility with appropriate resources  Outcome: Progressing     Problem: Physical Therapy - Adult  Goal: By Discharge: Performs mobility at highest level of function for planned discharge setting. See evaluation for individualized goals. Description: FUNCTIONAL STATUS PRIOR TO ADMISSION:  pt lived alone with son checking in on her and bringing groceries. Advanced dementia and \"failure to thrive\" at 79lbs. Unsure of functional level, yet not able to care for herself    HOME SUPPORT PRIOR TO ADMISSION: The patient lived alone with son to provide assistance. Physical Therapy Goals  Initiated 11/7/2023  1. Patient will move from supine to sit and sit to supine in bed with contact guard assist within 7 day(s). 2.  Patient will perform sit to stand with minimal assistance within 7 day(s). 3.  Patient will transfer from bed to chair and chair to bed with minimal assistance using the least restrictive device within 7 day(s). 4.  Patient will ambulate with minimal assistance for 10x2 feet with the least restrictive device within 7 day(s).    11/13/2023 1242 by Abimbola Escobar PTA  Outcome: Progressing     Problem: Occupational Therapy -
for appliance change or resting period.   11/10/2023 0818 by Tobias Barrett RN  Outcome: Not Progressing  11/10/2023 0153 by Jarrod Trivedi RN  Outcome: Progressing     Problem: Nutrition Deficit:  Goal: Optimize nutritional status  11/10/2023 0818 by Tobias Barrett RN  Outcome: Not Progressing  11/10/2023 0153 by Juanjo LEE RN  Outcome: Progressing     Problem: Pain  Goal: Verbalizes/displays adequate comfort level or baseline comfort level  11/10/2023 0818 by Tobias Barrett RN  Outcome: Not Progressing  11/10/2023 0153 by Jarrod Trivedi RN  Outcome: Progressing     Problem: Discharge Planning  Goal: Discharge to home or other facility with appropriate resources  11/10/2023 0818 by Tobias Barrett RN  Outcome: Not Progressing  11/10/2023 0153 by Jarrod Trivedi RN  Outcome: Progressing
direction for safe transfer             Activity Tolerance:   Fair   Please refer to the flowsheet for vital signs taken during this treatment. After treatment:   Patient left in no apparent distress in bed and Call bell within reach    COMMUNICATION/EDUCATION:   The patient's plan of care was discussed with: physical therapy assistant and occupational therapist    Patient Education  Education Given To: Patient  Education Provided: Fall Prevention Strategies  Education Method: Verbal  Barriers to Learning: Cognition  Education Outcome: Unable to demonstrate understanding    Thank you for this referral.  Sukhjinder Steiner.  MELISSA Jara/DIMA  Minutes: 20

## 2023-11-14 NOTE — CARE COORDINATION
Transition of Care Plan to SNF/Rehab    Communication to Patient/Family:  Met with patient and family and they are agreeable to the transition plan. The Plan for Transition of Care is related to the following treatment goals: failure to thrive. The Patient and/or patient representative was provided with a choice of provider and agrees  with the discharge plan. Yes [x] No []    A Freedom of choice list was provided with basic dialogue that supports the patient's individualized plan of care/goals and shares the quality data associated with the providers. Yes [x] No []    SNF/Rehab Transition:  Patient has been accepted to [facility] SNF/Rehab and meets criteria for admission. SNF reports [address] has been received? [x]  Medicare 3 night stay satisfied? []    Patient will be transported by [facility] and expected to leave at 3pm. [facility] to pay for wc transport. PCS completed, and packet in chart. This CM has updated both pt's Son Deandre Oliva and daughter Farzana Grayson, both children remain in agreement with the dc plan. Communication to SNF/Rehab:  Bedside RN, Awilda Bernheim, has been notified to update the transition plan to the facility and call report ([phone], room 144). Discharge information has been updated on the AVS. And communicated to facility via [employer]/All Scripts, or CC link. Discharge instructions to be fax'd to facility via [] AllScripts [x] [address] Please include all hard scripts for controlled substances, med rec and dc summary, and AVS in packet. Nursing, please discuss the following applicable information with JESSI's nursing during report:     Mercedes Jetty with (X) only those applicable:  Medication:  []Medications are available at the facility  []IV Antibiotics    []Controlled Substance - hard copies available sent.   []Weekly Labs    Equipment:  []CPAP/BiPAP  []Wound Vacuum  [][place] or Urinary Device  []PICC/Central Line  []Nebulizer  []Ventilator

## 2023-11-14 NOTE — DISCHARGE SUMMARY
[address] [place] [address]  [phone]          Hospitalist Discharge Summary     Patient ID:  Christina Cohen  [id number]  74 y.o.  1950    Admit date: 11/2/2023    Discharge date and time: 11/14/2023 10:26 AM    Admission Diagnoses: Dehydration [E86.0]  Lactic acidosis [E87.20]  Hypernatremia [E87.0]  Failure to thrive in adult [R62.7]    Discharge Diagnoses:  Principal Diagnosis Failure to thrive in adult                                            Principal Problem:    Failure to thrive in adult  Active Problems:    Lactic acidosis    Palliative care by specialist    Impaired cognitive ability    Restlessness and agitation    Physical debility    Generalized weakness    Inadequate oral intake    Counseling regarding advance care planning and goals of care    DNR (do not resuscitate) discussion  Resolved Problems:    * No resolved hospital problems. *         Hospital Course:     73 yo hx of advanced dementia, presented w/ failure to thrive, hypernatremia, dehydration     1) Advanced dementia w/ delirium: agitation has resolved. Has end stage dementia. Cont seroquel. Palliative care was following. Would benefit from hospice, but cannot receive this at home. Family cannot afford nursing home. Patient will go to SNF     2) Failure to thrive/severe pro-mitesh malnutrition: due to dementia. Cont diet for comfort     3) Hypernatremia/dehydration: improved with IVF     4) Anemia: likely dilutional.  Iron studies pending.   Hgb low but stable     PCP: Leighann Camara MD     Consults: palliative care    Significant Diagnostic Studies: none    Discharge Exam:  Physical Exam:    Gen:  elderly, frail, ill-appearing, NAD  HEENT:  Pink conjunctivae, PERRL, hearing intact to voice, dry mucous membranes  Neck:  Supple, without masses, thyroid non-tender  Resp:  No accessory muscle use, clear breath sounds without wheezes rales or rhonchi  Card:  No murmurs, normal

## 2023-11-15 NOTE — ADT AUTH CERT
[address]   SFM 3 PROG CARE TELE 2   [address]   [id number]   [id number]   [id number]   [id number]   [id number]   Auth number: [id number]     Return Contact:   Ana Maria Ruelas   Ph: [phone]   Fax: [phone]   Last edited by Ana Maria Ruelas on 23 at [address]     Patient Demographics    Name Patient ID SSN Gender Identity Birth Date   Logan Carrier [id number]  Female 46 (74 yrs)     Address Phone Email Employer    [address] [place] [address] [place] [address] [phone] (R)   [phone] [facility])   [phone] (H) [email or web address] Race Occupation Emp Status    Mariaelena Husbands / Jennifer Shank -- Retired      Reg Status PCP Date Last Verified Next Review Date    Verified [place], [place]  [phone] 23      Admission Date Discharge Date Admitting Provider     23 Kendall Larios MD       [place] [address]        Emergency Contact 1 Emergency Contact 2 Emergency Contact 3 Emergency Contact 4   Robert Kenneth [phone] (A)   [phone] [facility]) Yenifer Jadwin [phone] (S)   [phone] [facility]) Alexandra Sosa (cousin) Ruhtann Verma ([address])   [facility]   [phone] Carolina Massey   [phone] [facility]) Rome Latus (ex-partner) Sam Pinzon (I)   [phone] [facility])     Subscriber Details  Hospital Account [de-identified]  [employer] Subscriber Name/Sex/Relation Subscriber  Subscriber Address/Phone Subscriber Emp/Emp Phone   1. [id number]    [id number] [address] Female   (Self) [address] [place] [address]   [phone](M)   [phone](X) RETIRED      Utilization Reviews          Last Updated by Billy Celis RN on 11/10/2023 1037     Review Status Review Type Associated Date Created By   In Primary -- 11/10/2023 Billy Celis RN      Criteria Review   DATE: 2023 Acute Medical     PERTINENT UPDATES:  per CM:  Hospice following, appropriate for

## 2024-11-19 NOTE — ED TRIAGE NOTES
Patient arrives via EMS with c/o failure to thrive. Patient was last seen on Saturday, and found by son. Patient reportedly was not answering phone calls. Upon EMS arrival, patient was able to recognize family members. Patient is oriented to name and birthday. Disoriented to year, and situation. Patient denies chest pain, N/V/D, SOB. Presents with fatigue, generalized weakness, dry lips. Per EMS: BP: 119/85, B.
No.

## (undated) DEVICE — BAG BELONG PT PERS CLEAR HANDL

## (undated) DEVICE — SYR 3ML LL TIP 1/10ML GRAD --

## (undated) DEVICE — Device

## (undated) DEVICE — SET ADMIN 16ML TBNG L100IN 2 Y INJ SITE IV PIGGY BK DISP

## (undated) DEVICE — ELECTRODE,RADIOTRANSLUCENT,FOAM,3PK: Brand: MEDLINE

## (undated) DEVICE — SET GRAV CK VLV NEEDLESS ST 3 GANGED 4WAY STPCOCK HI FLO 10

## (undated) DEVICE — FORCEPS BX L240CM JAW DIA2.8MM L CAP W/ NDL MIC MESH TOOTH

## (undated) DEVICE — REM POLYHESIVE ADULT PATIENT RETURN ELECTRODE: Brand: VALLEYLAB

## (undated) DEVICE — (D)SENSOR RMFG 02 PULS OXMTR -- DISC BY MFR USE ITEM 133445

## (undated) DEVICE — NDL FLTR TIP 5 MIC 18GX1.5IN --

## (undated) DEVICE — CONTAINER SPEC 20 ML LID NEUT BUFF FORMALIN 10 % POLYPR STS

## (undated) DEVICE — CUFF RMFG BP INF SZ 11 DISP -- LAWSON OEM ITEM 238915

## (undated) DEVICE — BASIN EMSIS 16OZ GRAPHITE PLAS KID SHP MOLD GRAD FOR ORAL

## (undated) DEVICE — 1200 GUARD II KIT W/5MM TUBE W/O VAC TUBE: Brand: GUARDIAN

## (undated) DEVICE — BITEBLOCK ENDOSCP 60FR MAXI WHT POLYETH STURDY W/ VELC WVN

## (undated) DEVICE — NEEDLE SCLERO 23GA L4MM CATH L240CM CNTRST SHTH DIA1.8MM

## (undated) DEVICE — KIT COLON W/ 1.1OZ LUB AND 2 END

## (undated) DEVICE — SYR 5ML 1/5 GRAD LL NSAF LF --

## (undated) DEVICE — FIAPC® PROBE W/ FILTER 2200 SC OD 2.3MM/6.9FR; L 2.2M/7.2FT: Brand: ERBE

## (undated) DEVICE — SOLIDIFIER MEDC 1200ML -- CONVERT TO 356117

## (undated) DEVICE — NDL PRT INJ NSAF BLNT 18GX1.5 --

## (undated) DEVICE — CATH IV AUTOGRD BC BLU 22GA 25 -- INSYTE

## (undated) DEVICE — ADULT SPO2 SENSOR: Brand: NELLCOR

## (undated) DEVICE — BAG SPEC BIOHZRD 10 X 10 IN --

## (undated) DEVICE — SYR 10ML LUER LOK 1/5ML GRAD --

## (undated) DEVICE — CANNULA CUSH AD W/ 14FT TBG

## (undated) DEVICE — CANN NASAL O2 CAPNOGRAPHY AD -- FILTERLINE

## (undated) DEVICE — 3M™ CUROS™ DISINFECTING CAP FOR NEEDLELESS CONNECTORS 270/CARTON 20 CARTONS/CASE CFF1-270: Brand: CUROS™